# Patient Record
Sex: FEMALE | Race: BLACK OR AFRICAN AMERICAN | Employment: FULL TIME | URBAN - METROPOLITAN AREA
[De-identification: names, ages, dates, MRNs, and addresses within clinical notes are randomized per-mention and may not be internally consistent; named-entity substitution may affect disease eponyms.]

---

## 2019-07-07 ENCOUNTER — HOSPITAL ENCOUNTER (EMERGENCY)
Age: 39
Discharge: HOME | End: 2019-07-07
Attending: EMERGENCY MEDICINE
Payer: MEDICAID

## 2019-07-07 ENCOUNTER — APPOINTMENT (OUTPATIENT)
Dept: GENERAL RADIOLOGY | Age: 39
End: 2019-07-07
Payer: MEDICAID

## 2019-07-07 VITALS
OXYGEN SATURATION: 100 % | WEIGHT: 122.9 LBS | SYSTOLIC BLOOD PRESSURE: 134 MMHG | RESPIRATION RATE: 16 BRPM | HEIGHT: 67 IN | HEART RATE: 79 BPM | TEMPERATURE: 98.1 F | DIASTOLIC BLOOD PRESSURE: 86 MMHG | BODY MASS INDEX: 19.29 KG/M2

## 2019-07-07 DIAGNOSIS — S29.019A THORACIC MYOFASCIAL STRAIN, INITIAL ENCOUNTER: ICD-10-CM

## 2019-07-07 DIAGNOSIS — S53.402A SPRAIN OF LEFT ELBOW, INITIAL ENCOUNTER: ICD-10-CM

## 2019-07-07 DIAGNOSIS — S16.1XXA STRAIN OF NECK MUSCLE, INITIAL ENCOUNTER: ICD-10-CM
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
## 2019-07-07 DIAGNOSIS — S83.92XA SPRAIN OF LEFT KNEE, UNSPECIFIED LIGAMENT, INITIAL ENCOUNTER: ICD-10-CM

## 2019-07-07 PROCEDURE — 72040 X-RAY EXAM NECK SPINE 2-3 VW: CPT

## 2019-07-07 PROCEDURE — 72070 X-RAY EXAM THORAC SPINE 2VWS: CPT

## 2019-07-07 PROCEDURE — 99283 EMERGENCY DEPT VISIT LOW MDM: CPT

## 2019-07-07 RX ORDER — IBUPROFEN 600 MG/1
600 TABLET ORAL EVERY 6 HOURS PRN
Qty: 30 TABLET | Refills: 0 | Status: SHIPPED | OUTPATIENT
Start: 2019-07-07

## 2019-07-07 ASSESSMENT — PAIN DESCRIPTION - ONSET
ONSET_2: ON-GOING
ONSET: ON-GOING

## 2019-07-07 ASSESSMENT — PAIN DESCRIPTION - DESCRIPTORS
DESCRIPTORS_3: SHARP
DESCRIPTORS: SHARP
DESCRIPTORS_2: BURNING

## 2019-07-07 ASSESSMENT — PAIN DESCRIPTION - PROGRESSION
CLINICAL_PROGRESSION_2: NOT CHANGED
CLINICAL_PROGRESSION: GRADUALLY WORSENING
CLINICAL_PROGRESSION_3: NOT CHANGED

## 2019-07-07 ASSESSMENT — PAIN DESCRIPTION - PAIN TYPE
TYPE: ACUTE PAIN
TYPE_2: ACUTE PAIN
TYPE_3: ACUTE PAIN

## 2019-07-07 ASSESSMENT — PAIN DESCRIPTION - DURATION
DURATION_3: INTERMITTENT
DURATION_2: CONTINUOUS

## 2019-07-07 ASSESSMENT — PAIN DESCRIPTION - ORIENTATION
ORIENTATION_3: LEFT
ORIENTATION: LEFT
ORIENTATION_2: POSTERIOR

## 2019-07-07 ASSESSMENT — PAIN DESCRIPTION - LOCATION
LOCATION: NECK
LOCATION_2: KNEE
LOCATION_3: ELBOW

## 2019-07-07 ASSESSMENT — PAIN SCALES - GENERAL: PAINLEVEL_OUTOF10: 7

## 2019-07-07 ASSESSMENT — PAIN DESCRIPTION - INTENSITY
RATING_2: 2
RATING_3: 1

## 2019-07-07 ASSESSMENT — PAIN DESCRIPTION - FREQUENCY: FREQUENCY: CONTINUOUS
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
## 2019-07-07 NOTE — ED NOTES
The patient refused an ice pack at this time. Will continue to monitor.      Estrada Ross RN  07/07/19 1942

## 2023-10-15 ENCOUNTER — HOSPITAL ENCOUNTER (EMERGENCY)
Age: 43
Discharge: HOME | End: 2023-10-15
Attending: STUDENT IN AN ORGANIZED HEALTH CARE EDUCATION/TRAINING PROGRAM
Payer: MEDICAID

## 2023-10-15 VITALS
HEART RATE: 94 BPM | WEIGHT: 137.79 LBS | SYSTOLIC BLOOD PRESSURE: 140 MMHG | BODY MASS INDEX: 21.63 KG/M2 | HEIGHT: 67 IN | OXYGEN SATURATION: 99 % | DIASTOLIC BLOOD PRESSURE: 91 MMHG | TEMPERATURE: 98.5 F | RESPIRATION RATE: 16 BRPM

## 2023-10-15 DIAGNOSIS — R21 RASH: Primary | ICD-10-CM

## 2023-10-15 PROCEDURE — 99283 EMERGENCY DEPT VISIT LOW MDM: CPT

## 2023-10-15 PROCEDURE — 6370000000 HC RX 637 (ALT 250 FOR IP): Performed by: STUDENT IN AN ORGANIZED HEALTH CARE EDUCATION/TRAINING PROGRAM

## 2023-10-15 RX ORDER — PREDNISONE 20 MG/1
60 TABLET ORAL ONCE
Status: COMPLETED | OUTPATIENT
Start: 2023-10-15 | End: 2023-10-15

## 2023-10-15 RX ORDER — PREDNISONE 20 MG/1
TABLET ORAL
Qty: 42 TABLET | Refills: 0 | Status: SHIPPED | OUTPATIENT
Start: 2023-10-15 | End: 2023-11-04

## 2023-10-15 RX ORDER — BICTEGRAVIR SODIUM, EMTRICITABINE, AND TENOFOVIR ALAFENAMIDE FUMARATE 50; 200; 25 MG/1; MG/1; MG/1
TABLET ORAL
COMMUNITY
Start: 2023-09-15

## 2023-10-15 RX ORDER — DIPHENHYDRAMINE HCL 25 MG
25 TABLET ORAL
Status: COMPLETED | OUTPATIENT
Start: 2023-10-15 | End: 2023-10-15

## 2023-10-15 RX ORDER — CETIRIZINE HYDROCHLORIDE 10 MG/1
10 TABLET ORAL DAILY
Qty: 30 TABLET | Refills: 0 | Status: SHIPPED | OUTPATIENT
Start: 2023-10-15 | End: 2023-11-14

## 2023-10-15 RX ORDER — DIAPER,BRIEF,INFANT-TODD,DISP
EACH MISCELLANEOUS
Qty: 14 G | Refills: 0 | Status: SHIPPED | OUTPATIENT
Start: 2023-10-15 | End: 2023-10-22

## 2023-10-15 RX ADMIN — DIPHENHYDRAMINE HCL 25 MG: 25 TABLET ORAL at 19:55

## 2023-10-15 RX ADMIN — PREDNISONE 60 MG: 20 TABLET ORAL at 19:55

## 2023-10-15 ASSESSMENT — LIFESTYLE VARIABLES
HOW MANY STANDARD DRINKS CONTAINING ALCOHOL DO YOU HAVE ON A TYPICAL DAY: 1 OR 2
HOW OFTEN DO YOU HAVE A DRINK CONTAINING ALCOHOL: MONTHLY OR LESS

## 2023-10-15 ASSESSMENT — PAIN - FUNCTIONAL ASSESSMENT
PAIN_FUNCTIONAL_ASSESSMENT: PREVENTS OR INTERFERES SOME ACTIVE ACTIVITIES AND ADLS
PAIN_FUNCTIONAL_ASSESSMENT: 0-10

## 2023-10-15 ASSESSMENT — PAIN DESCRIPTION - PAIN TYPE: TYPE: ACUTE PAIN

## 2023-10-15 ASSESSMENT — PAIN SCALES - GENERAL: PAINLEVEL_OUTOF10: 8

## 2023-10-15 ASSESSMENT — PAIN DESCRIPTION - ONSET: ONSET: ON-GOING

## 2023-10-15 ASSESSMENT — PAIN DESCRIPTION - DESCRIPTORS: DESCRIPTORS: BURNING;ITCHING

## 2023-10-15 ASSESSMENT — PAIN DESCRIPTION - LOCATION: LOCATION: GENERALIZED

## 2023-10-15 ASSESSMENT — PAIN DESCRIPTION - FREQUENCY: FREQUENCY: CONTINUOUS

## 2023-10-15 NOTE — ED TRIAGE NOTES
Hx eczema and folliculitis, seen derm and feels like she is having a fare up. All over body, very itchy, dry, burning.

## 2023-10-16 NOTE — ED PROVIDER NOTES
99 % 5' 7\" (1.702 m) 137 lb 12.6 oz (62.5 kg)     General: A&O x 3, well appearing, NAD  Eyes: Extraocular movements grossly intact. No scleral icterus. Sclera non-injected. HEENT: Atraumatic. Normocephalic. Moist mucous membranes. CV: RRR, No murmurs or rubs. Resp: Clear to auscultation bilaterally. Normal respiratory effort. GI: Soft, nontender to palpation. Nondistended. MSK: No deformity, moving all extremities  Skin: Multiple flesh-colored papules with excoriation marks involving patient's chest, upper extremities, lower extremities, back, abdomen, neck. No active drainage or discharge coming from rash. Rash appears dry in nature. No skin sloughing or bullae visualized. No erythema, warmth  Neuro: Fluent speech. Symmetric facies. Answers questions appropriately. Normal gait. Psych: Appropriate affect, appropriate mood, cooperative. LABS  I have reviewed all labs for this visit. No results found for this visit on 10/15/23. RADIOLOGY  I have reviewed all radiographic studies for this visit. No orders to display          My ECG interpretation   N/A    ED COURSE/MDM    44 y.o. female presenting to the ED for rash. Patient is hemodynamically stable upon arrival to the emergency department. Patient is afebrile. Differential diagnose includes but not limited to contact dermatitis, folliculitis, eczema, cellulitis, scabies, bedbugs bites. Low suspicion for scabies given patient's rash does not exhibit a tract like fashion. Low suspicion for bedbugs considering patient has checked multiple times. Patient's rash could be secondary to eczema versus folliculitis. Low suspicion for HIV-associated rash given patient is still taking her Biktarvy and states her labs have been normal.  Low suspicion for syphilis considering rash does not involve palms and soles of feet only dorsal aspect of upper and lower extremities. Patient will be prescribed steroid taper and Zyrtec for symptom control.

## 2023-12-09 ENCOUNTER — HOSPITAL ENCOUNTER (EMERGENCY)
Age: 43
Discharge: HOME | End: 2023-12-09
Attending: STUDENT IN AN ORGANIZED HEALTH CARE EDUCATION/TRAINING PROGRAM
Payer: MEDICAID

## 2023-12-09 VITALS
RESPIRATION RATE: 14 BRPM | DIASTOLIC BLOOD PRESSURE: 100 MMHG | HEART RATE: 72 BPM | TEMPERATURE: 98.1 F | BODY MASS INDEX: 23.29 KG/M2 | SYSTOLIC BLOOD PRESSURE: 165 MMHG | WEIGHT: 148.37 LBS | OXYGEN SATURATION: 98 % | HEIGHT: 67 IN

## 2023-12-09 DIAGNOSIS — K04.7 DENTAL INFECTION: ICD-10-CM

## 2023-12-09 DIAGNOSIS — K08.89 PAIN, DENTAL: Primary | ICD-10-CM

## 2023-12-09 PROCEDURE — 99283 EMERGENCY DEPT VISIT LOW MDM: CPT

## 2023-12-09 RX ORDER — PENICILLIN V POTASSIUM 500 MG/1
500 TABLET ORAL 4 TIMES DAILY
Qty: 28 TABLET | Refills: 0 | Status: SHIPPED | OUTPATIENT
Start: 2023-12-09 | End: 2023-12-16

## 2023-12-09 ASSESSMENT — PAIN SCALES - GENERAL
PAINLEVEL_OUTOF10: 10
PAINLEVEL_OUTOF10: 10

## 2023-12-09 ASSESSMENT — PAIN - FUNCTIONAL ASSESSMENT
PAIN_FUNCTIONAL_ASSESSMENT: 0-10
PAIN_FUNCTIONAL_ASSESSMENT: 0-10

## 2023-12-09 ASSESSMENT — PAIN DESCRIPTION - ONSET: ONSET: ON-GOING

## 2023-12-09 ASSESSMENT — PAIN DESCRIPTION - PAIN TYPE: TYPE: ACUTE PAIN

## 2023-12-09 ASSESSMENT — PAIN DESCRIPTION - FREQUENCY: FREQUENCY: CONTINUOUS

## 2023-12-09 ASSESSMENT — PAIN DESCRIPTION - ORIENTATION: ORIENTATION: LOWER

## 2023-12-09 ASSESSMENT — PAIN DESCRIPTION - LOCATION: LOCATION: TEETH

## 2023-12-09 ASSESSMENT — PAIN DESCRIPTION - DESCRIPTORS: DESCRIPTORS: ACHING

## 2023-12-09 NOTE — ED NOTES
Dc'd to home  aware she needs to have bp rechecked by pmd  Walked out with ease  skin warm and dry  given paper RX per her request  to follow up with dentist  she told me she thought her bp being elevated was due to pain       Stoney Plascencia RN  12/09/23 8955

## 2023-12-09 NOTE — DISCHARGE INSTRUCTIONS
requires proof of income (example: pay stub or tax return). Scale is based on family size and income. Discounts can be 25%, 50%, 75%, or 100% of all services. Accepts [facility], Insurance, [address]  [address] [place] [address] [place], [place]  [phone]   Monday through Friday 8:00am-5:00pm  Must be a resident of [facility]   Sliding Fee Scale  *Scale requires proof of income (example: pay stub or tax return). Scale is based on family size and income. Discounts can be 25%, 50%, 75%, or 100% of all services. Accepts [facility], [employer], [address] [facility], [address] [place] [address]  [address] [place] [address] [place]  [phone]. [phone]   Monday-Friday 8am-4pm  Appointment Only   Will do emergency patients, but you must call first to get an appointment. No residency requirements  Sees only children up to the age of [place], Insurance, [address] [place] [address] Avenue of [address] [place] [address]  [phone]   Monday: 10:30 am - 6:30 pm Tuesday through Friday:  8 a.m. - 4 p.m. Appointment Only (will try to schedule a dental emergency in 1-3 days)   No residency requirements   Sliding Fee Scale  *Scale requires proof of income (example: pay stub or tax return). Scale is based on family size and income. Discounts can be 25%, 50%, 75%, or 100% of all services. Accepts [facility], Insurance, [address] [facility], 25 [facility]  [phone]  Monday 10:30am - 7:00pm Tuesday through Friday 8:15am - 5:00pm   Appointment Only No residency requirements   Sliding Fee Scale  *Scale requires proof of income (example: pay stub or tax return). Scale is based on family size and income. Discounts can be 25%, 50%, 75%, or 100% of all services.    [address] Medicaid, [address], [address] [place] [address] Primary Care  [place]  [address] [facility], [address] [place] [address]  [phone]  Monday 10:30am - 7:00pm Tuesday through Friday 8:15am - 5:00pm   Appointment Only No residency requirements   Sliding Fee Scale  *Scale requires proof of income (example: pay stub or tax return). Scale is based on family size and income. Discounts can be 25%, 50%, 75%, or 100% of all services. [facility], [facility] W [facility]   [address] [facility]  [phone]  Monday through Friday 8:15am - 5:00pm   Appointment Only No residency requirements   Sliding Fee Scale  *Scale requires proof of income (example: pay stub or tax return). Scale is based on family size and income. Discounts can be 25%, 50%, 75%, or 100% of all services.    [facility], Self-pay

## 2023-12-09 NOTE — ED PROVIDER NOTES
[address] [facility]    CHIEF COMPLAINT  Dental Pain (Lower teeth under gold crowns on teeth)       HISTORY OF PRESENT ILLNESS  Izabel Villalpando is a 44 y.o. female presenting to the ED for dental pain. Patient states dental pain onset started \"months ago. \"  Dental pain acutely worsened causing her to come to the emergency department today. Patient states she had a dental procedure 3 years ago by oral surgeons at [facility]. Patient states she has occasional fevers that have developed. Patient states she has taken Tylenol without significant relief of pain. Patient does state she has pain involving bottom incisors which are covered by gold caps. Cold caps were installed almost 20 years ago when patient was living in [place]. Patient states 3 years ago she had the majority of her bottom teeth pulled. - History obtained from: Patient  - Limitations to history: None    I have reviewed the following from the nursing documentation:    Past Medical History:   Diagnosis Date    Asthma     Folliculitis     HIV (human immunodeficiency virus infection) ([address] )     HIV (human immunodeficiency virus infection) ([address] )      Past Surgical History:   Procedure Laterality Date    LYMPH NODE BIOPSY       History reviewed. No pertinent family history. Social History     Socioeconomic History    Marital status: Single     Spouse name: Not on file    Number of children: Not on file    Years of education: Not on file    Highest education level: Not on file   Occupational History    Not on file   Tobacco Use    Smoking status: Every Day     Packs/day: .5     Types: Cigarettes    Smokeless tobacco: Former   Substance and Sexual Activity    Alcohol use: Yes     Comment: occ. Drug use: Yes     Types: Marijuana [place]     Comment: Daily.     Sexual activity: Not on file   Other Topics Concern    Not on file   Social History Narrative    Not on file     Social Determinants of Health     Financial Resource facies. Answers questions appropriately. Normal gait. Psych: Appropriate affect, appropriate mood, cooperative. LABS  I have reviewed all labs for this visit. No results found for this visit on 12/09/23. RADIOLOGY  I have reviewed all radiographic studies for this visit. No orders to display          My ECG interpretation   N/A    ED COURSE/MDM    44 y.o. female presenting to the ED for dental pain. Patient is hemodynamically stable upon arrival to the emergency department. Patient hypertensive at 165/100. Patient has no neurological symptoms. Patient has no visual changes. Patient states normal she is not hypertensive, hypertension could be secondary to pain. Afebrile 36.7. Pulse 72. Respiratory rate 14. Satting 98% on room air. Differential diagnosis includes but not limited to periodontal abscess, dental fracture, dental caries, periapical abscess. Low suspicion for Ivan's or peritonsillar abscess based on physical examination. Patient offered dental block in the emergency department for pain control however she declined. On physical exam no obvious periapical abscess however patient does have significant gum disease and pain with palpation involving the bottom incisors. Patient could have underlying dental infection. Patient was agreeable to antibiotics. Patient was offered Tylenol and ibuprofen prescriptions however she declined. Patient requested that I prescribe her tramadol for her dental pain. I informed patient that pain medication prescriptions this provider dependent and I would be more comfortable prescribing Tylenol or ibuprofen to see how she trials on those medication. Patient stated that she would obtain tramadol prescription from her primary care doctor. Patient was given resources for dental clinics and discharge paperwork. Patient given return precautions. - Chronic Conditions: HIV, asthma  - Records reviewed:  Infectious disease note from 10/17/2023    -

## 2024-05-12 ENCOUNTER — OFFICE VISIT (OUTPATIENT)
Age: 44
End: 2024-05-12

## 2024-05-12 VITALS
BODY MASS INDEX: 22.55 KG/M2 | DIASTOLIC BLOOD PRESSURE: 88 MMHG | OXYGEN SATURATION: 99 % | WEIGHT: 144 LBS | RESPIRATION RATE: 18 BRPM | HEART RATE: 71 BPM | SYSTOLIC BLOOD PRESSURE: 166 MMHG | TEMPERATURE: 97.6 F

## 2024-05-12 DIAGNOSIS — R03.0 ELEVATED BLOOD-PRESSURE READING WITHOUT DIAGNOSIS OF HYPERTENSION: ICD-10-CM

## 2024-05-12 DIAGNOSIS — Z87.09 HISTORY OF ASTHMA: Primary | ICD-10-CM

## 2024-05-12 RX ORDER — ALBUTEROL SULFATE 90 UG/1
AEROSOL, METERED RESPIRATORY (INHALATION)
Qty: 18 G | Refills: 0 | Status: SHIPPED | OUTPATIENT
Start: 2024-05-12

## 2024-05-12 RX ORDER — ALBUTEROL SULFATE 90 UG/1
AEROSOL, METERED RESPIRATORY (INHALATION)
COMMUNITY
End: 2024-05-12 | Stop reason: SDUPTHER

## 2024-11-21 ENCOUNTER — HOSPITAL ENCOUNTER (EMERGENCY)
Age: 44
Discharge: HOME | End: 2024-11-21
Payer: MEDICAID

## 2024-11-21 ENCOUNTER — APPOINTMENT (OUTPATIENT)
Dept: CT IMAGING | Age: 44
End: 2024-11-21
Payer: MEDICAID

## 2024-11-21 ENCOUNTER — APPOINTMENT (OUTPATIENT)
Dept: GENERAL RADIOLOGY | Age: 44
End: 2024-11-21
Payer: MEDICAID

## 2024-11-21 VITALS
RESPIRATION RATE: 18 BRPM | HEIGHT: 67 IN | SYSTOLIC BLOOD PRESSURE: 132 MMHG | WEIGHT: 132.72 LBS | TEMPERATURE: 97.8 F | DIASTOLIC BLOOD PRESSURE: 96 MMHG | HEART RATE: 99 BPM | OXYGEN SATURATION: 98 % | BODY MASS INDEX: 20.83 KG/M2

## 2024-11-21 DIAGNOSIS — J06.9 VIRAL URI WITH COUGH: Primary | ICD-10-CM

## 2024-11-21 DIAGNOSIS — R79.89 ELEVATED BRAIN NATRIURETIC PEPTIDE (BNP) LEVEL: ICD-10-CM

## 2024-11-21 DIAGNOSIS — J81.0 PULMONARY EDEMA, ACUTE: ICD-10-CM

## 2024-11-21 DIAGNOSIS — Z72.0 TOBACCO USE: ICD-10-CM

## 2024-11-21 DIAGNOSIS — J45.901 EXACERBATION OF ASTHMA, UNSPECIFIED ASTHMA SEVERITY, UNSPECIFIED WHETHER PERSISTENT: ICD-10-CM

## 2024-11-21 LAB
ALBUMIN SERPL-MCNC: 3.2 G/DL (ref 3.4–5)
ALBUMIN/GLOB SERPL: 1 {RATIO} (ref 1.1–2.2)
ALP SERPL-CCNC: 62 U/L (ref 40–129)
ALT SERPL-CCNC: 23 U/L (ref 10–40)
ANION GAP SERPL CALCULATED.3IONS-SCNC: 14 MMOL/L (ref 3–16)
ANISOCYTOSIS BLD QL SMEAR: ABNORMAL
AST SERPL-CCNC: 79 U/L (ref 15–37)
BASOPHILS # BLD: 0 K/UL (ref 0–0.2)
BASOPHILS NFR BLD: 0.5 %
BILIRUB SERPL-MCNC: 0.4 MG/DL (ref 0–1)
BUN SERPL-MCNC: 15 MG/DL (ref 7–20)
CALCIUM SERPL-MCNC: 7.4 MG/DL (ref 8.3–10.6)
CHLORIDE SERPL-SCNC: 106 MMOL/L (ref 99–110)
CO2 SERPL-SCNC: 22 MMOL/L (ref 21–32)
CREAT SERPL-MCNC: 1 MG/DL (ref 0.6–1.1)
D-DIMER QUANTITATIVE: 7.03 UG/ML FEU (ref 0–0.6)
DEPRECATED RDW RBC AUTO: 19.5 % (ref 12.4–15.4)
EOSINOPHIL # BLD: 0 K/UL (ref 0–0.6)
EOSINOPHIL NFR BLD: 0 %
FLUAV + FLUBV AG NOSE IA.RAPID: NOT DETECTED
FLUAV + FLUBV AG NOSE IA.RAPID: NOT DETECTED
GFR SERPLBLD CREATININE-BSD FMLA CKD-EPI: 71 ML/MIN/{1.73_M2}
GLUCOSE SERPL-MCNC: 101 MG/DL (ref 70–99)
HCT VFR BLD AUTO: 34.4 % (ref 36–48)
HGB BLD-MCNC: 11.3 G/DL (ref 12–16)
LYMPHOCYTES # BLD: 1.1 K/UL (ref 1–5.1)
LYMPHOCYTES NFR BLD: 30.1 %
MCH RBC QN AUTO: 27 PG (ref 26–34)
MCHC RBC AUTO-ENTMCNC: 32.8 G/DL (ref 31–36)
MCV RBC AUTO: 82.2 FL (ref 80–100)
MICROCYTES BLD QL SMEAR: ABNORMAL
MONOCYTES # BLD: 0.2 K/UL (ref 0–1.3)
MONOCYTES NFR BLD: 6.2 %
NEUTROPHILS # BLD: 2.3 K/UL (ref 1.7–7.7)
NEUTROPHILS NFR BLD: 63.2 %
NT-PROBNP SERPL-MCNC: ABNORMAL PG/ML (ref 0–124)
OVALOCYTES BLD QL SMEAR: ABNORMAL
PATH INTERP BLD-IMP: YES
PLATELET # BLD AUTO: 137 K/UL (ref 135–450)
PLATELET BLD QL SMEAR: ADEQUATE
PMV BLD AUTO: 8.9 FL (ref 5–10.5)
POIKILOCYTOSIS BLD QL SMEAR: ABNORMAL
POTASSIUM SERPL-SCNC: 3.6 MMOL/L (ref 3.5–5.1)
PROT SERPL-MCNC: 6.5 G/DL (ref 6.4–8.2)
RBC # BLD AUTO: 4.19 M/UL (ref 4–5.2)
REASON FOR REJECTION: NORMAL
REJECTED TEST: NORMAL
SARS-COV-2 RDRP RESP QL NAA+PROBE: NOT DETECTED
SCHISTOCYTES BLD QL SMEAR: ABNORMAL
SLIDE REVIEW: ABNORMAL
SODIUM SERPL-SCNC: 142 MMOL/L (ref 136–145)
TROPONIN, HIGH SENSITIVITY: 30 NG/L (ref 0–14)
TROPONIN, HIGH SENSITIVITY: 32 NG/L (ref 0–14)
WBC # BLD AUTO: 3.7 K/UL (ref 4–11)

## 2024-11-21 PROCEDURE — 84484 ASSAY OF TROPONIN QUANT: CPT

## 2024-11-21 PROCEDURE — 96374 THER/PROPH/DIAG INJ IV PUSH: CPT

## 2024-11-21 PROCEDURE — 87502 INFLUENZA DNA AMP PROBE: CPT

## 2024-11-21 PROCEDURE — 83880 ASSAY OF NATRIURETIC PEPTIDE: CPT

## 2024-11-21 PROCEDURE — 80053 COMPREHEN METABOLIC PANEL: CPT

## 2024-11-21 PROCEDURE — 93005 ELECTROCARDIOGRAM TRACING: CPT | Performed by: GENERAL ACUTE CARE HOSPITAL

## 2024-11-21 PROCEDURE — 6370000000 HC RX 637 (ALT 250 FOR IP): Performed by: GENERAL ACUTE CARE HOSPITAL

## 2024-11-21 PROCEDURE — 71260 CT THORAX DX C+: CPT

## 2024-11-21 PROCEDURE — 71046 X-RAY EXAM CHEST 2 VIEWS: CPT

## 2024-11-21 PROCEDURE — 96375 TX/PRO/DX INJ NEW DRUG ADDON: CPT

## 2024-11-21 PROCEDURE — 6360000004 HC RX CONTRAST MEDICATION: Performed by: GENERAL ACUTE CARE HOSPITAL

## 2024-11-21 PROCEDURE — 94640 AIRWAY INHALATION TREATMENT: CPT

## 2024-11-21 PROCEDURE — 99285 EMERGENCY DEPT VISIT HI MDM: CPT

## 2024-11-21 PROCEDURE — 94760 N-INVAS EAR/PLS OXIMETRY 1: CPT

## 2024-11-21 PROCEDURE — 85379 FIBRIN DEGRADATION QUANT: CPT

## 2024-11-21 PROCEDURE — 87635 SARS-COV-2 COVID-19 AMP PRB: CPT

## 2024-11-21 PROCEDURE — 85025 COMPLETE CBC W/AUTO DIFF WBC: CPT

## 2024-11-21 PROCEDURE — 6360000002 HC RX W HCPCS: Performed by: GENERAL ACUTE CARE HOSPITAL

## 2024-11-21 RX ORDER — FUROSEMIDE 40 MG/1
40 TABLET ORAL DAILY
Qty: 7 TABLET | Refills: 0 | Status: SHIPPED | OUTPATIENT
Start: 2024-11-21 | End: 2024-11-28

## 2024-11-21 RX ORDER — KETOROLAC TROMETHAMINE 30 MG/ML
30 INJECTION, SOLUTION INTRAMUSCULAR; INTRAVENOUS ONCE
Status: DISCONTINUED | OUTPATIENT
Start: 2024-11-21 | End: 2024-11-21

## 2024-11-21 RX ORDER — PREDNISONE 20 MG/1
60 TABLET ORAL ONCE
Status: COMPLETED | OUTPATIENT
Start: 2024-11-21 | End: 2024-11-21

## 2024-11-21 RX ORDER — IOPAMIDOL 755 MG/ML
75 INJECTION, SOLUTION INTRAVASCULAR
Status: COMPLETED | OUTPATIENT
Start: 2024-11-21 | End: 2024-11-21

## 2024-11-21 RX ORDER — HYDROCODONE BITARTRATE AND HOMATROPINE METHYLBROMIDE ORAL SOLUTION 5; 1.5 MG/5ML; MG/5ML
5 LIQUID ORAL ONCE
Status: COMPLETED | OUTPATIENT
Start: 2024-11-21 | End: 2024-11-21

## 2024-11-21 RX ORDER — IPRATROPIUM BROMIDE AND ALBUTEROL SULFATE 2.5; .5 MG/3ML; MG/3ML
1 SOLUTION RESPIRATORY (INHALATION) ONCE
Status: COMPLETED | OUTPATIENT
Start: 2024-11-21 | End: 2024-11-21

## 2024-11-21 RX ORDER — DORAVIRINE 100 MG/1
100 TABLET, FILM COATED ORAL DAILY
COMMUNITY

## 2024-11-21 RX ORDER — KETOROLAC TROMETHAMINE 30 MG/ML
30 INJECTION, SOLUTION INTRAMUSCULAR; INTRAVENOUS ONCE
Status: COMPLETED | OUTPATIENT
Start: 2024-11-21 | End: 2024-11-21

## 2024-11-21 RX ORDER — KETOROLAC TROMETHAMINE 30 MG/ML
60 INJECTION, SOLUTION INTRAMUSCULAR; INTRAVENOUS ONCE
Status: DISCONTINUED | OUTPATIENT
Start: 2024-11-21 | End: 2024-11-21

## 2024-11-21 RX ORDER — FUROSEMIDE 10 MG/ML
40 INJECTION INTRAMUSCULAR; INTRAVENOUS ONCE
Status: COMPLETED | OUTPATIENT
Start: 2024-11-21 | End: 2024-11-21

## 2024-11-21 RX ADMIN — PREDNISONE 60 MG: 20 TABLET ORAL at 18:51

## 2024-11-21 RX ADMIN — IPRATROPIUM BROMIDE AND ALBUTEROL SULFATE 1 DOSE: .5; 3 SOLUTION RESPIRATORY (INHALATION) at 18:33

## 2024-11-21 RX ADMIN — Medication 5 ML: at 18:52

## 2024-11-21 RX ADMIN — IOPAMIDOL 75 ML: 755 INJECTION, SOLUTION INTRAVENOUS at 19:54

## 2024-11-21 RX ADMIN — FUROSEMIDE 40 MG: 10 INJECTION, SOLUTION INTRAMUSCULAR; INTRAVENOUS at 21:47

## 2024-11-21 RX ADMIN — KETOROLAC TROMETHAMINE 30 MG: 30 INJECTION, SOLUTION INTRAMUSCULAR at 19:34

## 2024-11-21 ASSESSMENT — PAIN DESCRIPTION - ORIENTATION: ORIENTATION: MID;LEFT

## 2024-11-21 ASSESSMENT — LIFESTYLE VARIABLES
HOW OFTEN DO YOU HAVE A DRINK CONTAINING ALCOHOL: NEVER
HOW MANY STANDARD DRINKS CONTAINING ALCOHOL DO YOU HAVE ON A TYPICAL DAY: PATIENT DOES NOT DRINK

## 2024-11-21 ASSESSMENT — PAIN DESCRIPTION - FREQUENCY: FREQUENCY: INTERMITTENT

## 2024-11-21 ASSESSMENT — PAIN SCALES - GENERAL
PAINLEVEL_OUTOF10: 6
PAINLEVEL_OUTOF10: 2
PAINLEVEL_OUTOF10: 8

## 2024-11-21 ASSESSMENT — PAIN DESCRIPTION - LOCATION
LOCATION: CHEST;BACK;RIB CAGE
LOCATION: CHEST;RIB CAGE;BACK

## 2024-11-21 ASSESSMENT — PAIN - FUNCTIONAL ASSESSMENT
PAIN_FUNCTIONAL_ASSESSMENT: 0-10
PAIN_FUNCTIONAL_ASSESSMENT: 0-10
PAIN_FUNCTIONAL_ASSESSMENT: ACTIVITIES ARE NOT PREVENTED

## 2024-11-21 ASSESSMENT — PAIN DESCRIPTION - DESCRIPTORS: DESCRIPTORS: SHARP

## 2024-11-21 ASSESSMENT — PAIN DESCRIPTION - PAIN TYPE: TYPE: ACUTE PAIN

## 2024-11-21 NOTE — ED TRIAGE NOTES
Pt states that she has been experiencing trouble breathing for the past week. Pt states that she has hx of asthma. Pt states that she has been using her inhaler with no relief. Pt endorses pain in back, chest, and side. Pt endorses productive cough with yellow sputum. Pt endorses chills with no known fever. Pt AAO x 4. VSS.

## 2024-11-21 NOTE — ED PROVIDER NOTES
lower leg: No tenderness. No edema.   Skin:     General: Skin is warm and dry.      Capillary Refill: Capillary refill takes less than 2 seconds.   Neurological:      General: No focal deficit present.      Mental Status: She is alert and oriented to person, place, and time.   Psychiatric:         Mood and Affect: Mood normal.         Behavior: Behavior normal.         Thought Content: Thought content normal.         Judgment: Judgment normal.         MEDICAL DECISION MAKING    Vitals:    Vitals:    11/21/24 1733 11/21/24 1833 11/21/24 2032 11/21/24 2148   BP: (!) 133/93  120/87 (!) 132/96   Pulse: (!) 113  97 99   Resp: 18      Temp: 97.8 °F (36.6 °C)      TempSrc: Oral      SpO2: 100% 99% 100% 98%   Weight: 60.2 kg (132 lb 11.5 oz)      Height: 1.702 m (5' 7\")          LABS:  Labs Reviewed   CBC WITH AUTO DIFFERENTIAL - Abnormal; Notable for the following components:       Result Value    WBC 3.7 (*)     Hemoglobin 11.3 (*)     Hematocrit 34.4 (*)     RDW 19.5 (*)     Anisocytosis 1+ (*)     Microcytes 1+ (*)     Poikilocytes 2+ (*)     Schistocytes 1+ (*)     Ovalocytes 1+ (*)     All other components within normal limits   TROPONIN - Abnormal; Notable for the following components:    Troponin, High Sensitivity 30 (*)     All other components within normal limits   D-DIMER, QUANTITATIVE - Abnormal; Notable for the following components:    D-Dimer, Quant 7.03 (*)     All other components within normal limits   COMPREHENSIVE METABOLIC PANEL W/ REFLEX TO MG FOR LOW K - Abnormal; Notable for the following components:    Glucose 101 (*)     Calcium 7.4 (*)     Albumin 3.2 (*)     Albumin/Globulin Ratio 1.0 (*)     AST 79 (*)     All other components within normal limits    Narrative:     Prior specimen rejected: Hemolyzed. Advised ED of need for recollect.   TROPONIN - Abnormal; Notable for the following components:    Troponin, High Sensitivity 32 (*)     All other components within normal limits    Narrative:      mildly hypertensive with blood pressure 133/93.  Heart rate is 113.  She is not hypoxic.  No increased work of breathing noted.  Patient with intermittent nonproductive cough noted throughout assessment.    Differential diagnoses include but not limited to viral URI, COVID, influenza, pneumonia, ACS, MI, cardiac arrhythmia, PE    An EKG is interpreted by ED physician and reviewed by myself and is negative for acute ST elevation or cardiac arrhythmia.    Patient given DuoNeb breathing treatment per RT.  She is given IV Toradol 30 mg and prednisone 60 mg by mouth.  Laboratory and imaging studies are pending.    Disposition Considerations (Tests not ordered but considered, Shared Decision Making, Pt Expectation of Test or Tx.): ***           The patient tolerated their visit well.  I evaluated the patient.  The physician was available for consultation as needed.  The patient and / or the family were informed of the results of any tests, a time was given to answer questions, a plan was proposed and they agreed with plan.     I am the Primary Clinician of Record.     CLINICAL IMPRESSION:  1. Viral URI with cough    2. Exacerbation of asthma, unspecified asthma severity, unspecified whether persistent    3. Pulmonary edema, acute    4. Elevated brain natriuretic peptide (BNP) level    5. Tobacco use        DISPOSITION Decision To Discharge 11/21/2024 09:41:28 PM           PATIENT REFERRED TO:  Jabier Bonds MD  [address] [place] [address]  Infectious Disease  [facility] [address] [phone]    Call in 1 day      [address]  [address]  [facility] [address] [phone]  In 1 day  Cardiology-call first thing tomorrow morning to arrange for follow-up appointment      DISCHARGE MEDICATIONS:  New Prescriptions    FUROSEMIDE (LASIX) 40 MG TABLET    Take 1 tablet by mouth daily for 7 days       DISCONTINUED MEDICATIONS:  Discontinued Medications    No

## 2024-11-22 LAB
EKG ATRIAL RATE: 111 BPM
EKG DIAGNOSIS: NORMAL
EKG P AXIS: 69 DEGREES
EKG P-R INTERVAL: 126 MS
EKG Q-T INTERVAL: 294 MS
EKG QRS DURATION: 74 MS
EKG QTC CALCULATION (BAZETT): 399 MS
EKG R AXIS: 87 DEGREES
EKG T AXIS: -70 DEGREES
EKG VENTRICULAR RATE: 111 BPM
PATH INTERP BLD-IMP: NORMAL

## 2024-11-22 PROCEDURE — 93010 ELECTROCARDIOGRAM REPORT: CPT | Performed by: INTERNAL MEDICINE

## 2024-11-22 NOTE — DISCHARGE INSTRUCTIONS
Take the prescribed prednisone and Lasix as directed.    Call your primary care provider tomorrow to arrange for close outpatient follow-up appointment.    Call tomorrow to arrange for close outpatient follow-up appointment with cardiology    Return for high fever, incessant vomiting, severe pain, any other worsening symptoms.

## 2024-11-25 ASSESSMENT — HEART SCORE: ECG: NORMAL

## 2024-11-25 ASSESSMENT — ENCOUNTER SYMPTOMS
NAUSEA: 0
BACK PAIN: 0
TROUBLE SWALLOWING: 0
WHEEZING: 1
CHEST TIGHTNESS: 1
VOMITING: 0
ABDOMINAL PAIN: 0
VOICE CHANGE: 0
COUGH: 1
SORE THROAT: 0
SHORTNESS OF BREATH: 1

## 2024-12-03 ENCOUNTER — APPOINTMENT (OUTPATIENT)
Dept: CT IMAGING | Age: 44
DRG: 192 | End: 2024-12-03
Payer: MEDICAID

## 2024-12-03 ENCOUNTER — APPOINTMENT (OUTPATIENT)
Age: 44
DRG: 192 | End: 2024-12-03
Attending: INTERNAL MEDICINE
Payer: MEDICAID

## 2024-12-03 ENCOUNTER — HOSPITAL ENCOUNTER (INPATIENT)
Age: 44
LOS: 6 days | Discharge: HOME | DRG: 192 | End: 2024-12-09
Attending: FAMILY MEDICINE | Admitting: FAMILY MEDICINE
Payer: MEDICAID

## 2024-12-03 DIAGNOSIS — I50.9 ACUTE CONGESTIVE HEART FAILURE, UNSPECIFIED HEART FAILURE TYPE (HCC): Primary | ICD-10-CM

## 2024-12-03 DIAGNOSIS — D64.9 ANEMIA, UNSPECIFIED TYPE: ICD-10-CM

## 2024-12-03 DIAGNOSIS — N28.9 RENAL IMPAIRMENT: ICD-10-CM

## 2024-12-03 DIAGNOSIS — I31.39 PERICARDIAL EFFUSION: ICD-10-CM

## 2024-12-03 DIAGNOSIS — R06.02 SHORTNESS OF BREATH: ICD-10-CM

## 2024-12-03 DIAGNOSIS — R79.89 ELEVATED TROPONIN: ICD-10-CM

## 2024-12-03 DIAGNOSIS — J90 PLEURAL EFFUSION: ICD-10-CM

## 2024-12-03 DIAGNOSIS — I50.9 CONGESTIVE HEART FAILURE, UNSPECIFIED HF CHRONICITY, UNSPECIFIED HEART FAILURE TYPE (HCC): ICD-10-CM

## 2024-12-03 DIAGNOSIS — I50.21 ACUTE SYSTOLIC (CONGESTIVE) HEART FAILURE (HCC): ICD-10-CM

## 2024-12-03 PROBLEM — I50.31 ACUTE DIASTOLIC CHF (CONGESTIVE HEART FAILURE) (HCC): Status: ACTIVE | Noted: 2024-12-03

## 2024-12-03 LAB
ALBUMIN SERPL-MCNC: 3.4 G/DL (ref 3.4–5)
ALBUMIN/GLOB SERPL: 1 {RATIO} (ref 1.1–2.2)
ALP SERPL-CCNC: 75 U/L (ref 40–129)
ALT SERPL-CCNC: 22 U/L (ref 10–40)
ANION GAP SERPL CALCULATED.3IONS-SCNC: 9 MMOL/L (ref 3–16)
ANISOCYTOSIS BLD QL SMEAR: ABNORMAL
AST SERPL-CCNC: 68 U/L (ref 15–37)
BACTERIA URNS QL MICRO: ABNORMAL /HPF
BASE EXCESS MIXED: 4
BASOPHILS # BLD: 0 K/UL (ref 0–0.2)
BASOPHILS NFR BLD: 0.9 %
BILIRUB SERPL-MCNC: 0.3 MG/DL (ref 0–1)
BILIRUB UR QL STRIP.AUTO: NEGATIVE
BUN SERPL-MCNC: 12 MG/DL (ref 7–20)
CALCIUM SERPL-MCNC: 7.8 MG/DL (ref 8.3–10.6)
CHLORIDE SERPL-SCNC: 106 MMOL/L (ref 99–110)
CLARITY UR: CLEAR
CO2 SERPL-SCNC: 25 MMOL/L (ref 21–32)
COLOR UR: YELLOW
CREAT SERPL-MCNC: 1.2 MG/DL (ref 0.6–1.1)
DEPRECATED RDW RBC AUTO: 20.5 % (ref 12.4–15.4)
ECHO AV AREA PEAK VELOCITY: 2.3 CM2
ECHO AV AREA VTI: 2.5 CM2
ECHO AV AREA/BSA PEAK VELOCITY: 1.4 CM2/M2
ECHO AV AREA/BSA VTI: 1.5 CM2/M2
ECHO AV MEAN GRADIENT: 2 MMHG
ECHO AV MEAN VELOCITY: 0.6 M/S
ECHO AV PEAK GRADIENT: 3 MMHG
ECHO AV PEAK VELOCITY: 0.8 M/S
ECHO AV VELOCITY RATIO: 0.75
ECHO AV VTI: 11.9 CM
ECHO BSA: 1.68 M2
ECHO IVC EXP: 2.2 CM
ECHO LV EF PHYSICIAN: 15 %
ECHO LV FRACTIONAL SHORTENING: 12 % (ref 28–44)
ECHO LV INTERNAL DIMENSION DIASTOLE INDEX: 2.96 CM/M2
ECHO LV INTERNAL DIMENSION DIASTOLIC: 5 CM (ref 3.9–5.3)
ECHO LV INTERNAL DIMENSION SYSTOLIC INDEX: 2.6 CM/M2
ECHO LV INTERNAL DIMENSION SYSTOLIC: 4.4 CM
ECHO LV IVSD: 1 CM (ref 0.6–0.9)
ECHO LV MASS 2D: 194.4 G (ref 67–162)
ECHO LV MASS INDEX 2D: 115 G/M2 (ref 43–95)
ECHO LV POSTERIOR WALL DIASTOLIC: 1.1 CM (ref 0.6–0.9)
ECHO LV RELATIVE WALL THICKNESS RATIO: 0.44
ECHO LVOT AREA: 3.1 CM2
ECHO LVOT AV VTI INDEX: 0.78
ECHO LVOT DIAM: 2 CM
ECHO LVOT MEAN GRADIENT: 1 MMHG
ECHO LVOT PEAK GRADIENT: 1 MMHG
ECHO LVOT PEAK VELOCITY: 0.6 M/S
ECHO LVOT STROKE VOLUME INDEX: 17.3 ML/M2
ECHO LVOT SV: 29.2 ML
ECHO LVOT VTI: 9.3 CM
ECHO RV BASAL DIMENSION: 4.2 CM
ECHO RV LONGITUDINAL DIMENSION: 8.5 CM
ECHO RV MID DIMENSION: 2.6 CM
ECHO RV TAPSE: 1.2 CM (ref 1.7–?)
EOSINOPHIL # BLD: 0 K/UL (ref 0–0.6)
EOSINOPHIL NFR BLD: 0.4 %
EPI CELLS #/AREA URNS AUTO: 14 /HPF (ref 0–5)
FERRITIN SERPL IA-MCNC: 365 NG/ML (ref 15–150)
FLUAV + FLUBV AG NOSE IA.RAPID: NOT DETECTED
FLUAV + FLUBV AG NOSE IA.RAPID: NOT DETECTED
GFR SERPLBLD CREATININE-BSD FMLA CKD-EPI: 57 ML/MIN/{1.73_M2}
GLUCOSE SERPL-MCNC: 96 MG/DL (ref 70–99)
GLUCOSE UR STRIP.AUTO-MCNC: NEGATIVE MG/DL
HCG SERPL QL: NEGATIVE
HCO3, MIXED: 29.3 MMOL/L
HCT VFR BLD AUTO: 31.7 % (ref 36–48)
HGB BLD-MCNC: 10.3 G/DL (ref 12–16)
HGB UR QL STRIP.AUTO: NEGATIVE
HYALINE CASTS #/AREA URNS AUTO: 1 /LPF (ref 0–8)
IRON SATN MFR SERPL: 14 % (ref 15–50)
IRON SERPL-MCNC: 30 UG/DL (ref 37–145)
KETONES UR STRIP.AUTO-MCNC: ABNORMAL MG/DL
LEUKOCYTE ESTERASE UR QL STRIP.AUTO: ABNORMAL
LYMPHOCYTES # BLD: 0.9 K/UL (ref 1–5.1)
LYMPHOCYTES NFR BLD: 29.1 %
MCH RBC QN AUTO: 27.7 PG (ref 26–34)
MCHC RBC AUTO-ENTMCNC: 32.3 G/DL (ref 31–36)
MCV RBC AUTO: 85.5 FL (ref 80–100)
MICROCYTES BLD QL SMEAR: ABNORMAL
MONOCYTES # BLD: 0.3 K/UL (ref 0–1.3)
MONOCYTES NFR BLD: 8.5 %
MUCUS: PRESENT
NEUTROPHILS # BLD: 1.9 K/UL (ref 1.7–7.7)
NEUTROPHILS NFR BLD: 61.1 %
NITRITE UR QL STRIP.AUTO: NEGATIVE
NT-PROBNP SERPL-MCNC: 7129 PG/ML (ref 0–124)
O2 SAT, MIXED: 50 %
OVALOCYTES BLD QL SMEAR: ABNORMAL
PATH INTERP BLD-IMP: NO
PCO2 MIXED: 48.5 MM HG
PERFORMED ON: NORMAL
PH UR STRIP.AUTO: 7.5 [PH] (ref 5–8)
PH, MIXED: 7.39 (ref 7.35–7.45)
PLATELET # BLD AUTO: 187 K/UL (ref 135–450)
PLATELET BLD QL SMEAR: ADEQUATE
PMV BLD AUTO: 7.9 FL (ref 5–10.5)
PO2 MIXED: 28 MM HG
POC SAMPLE TYPE: NORMAL
POIKILOCYTOSIS BLD QL SMEAR: ABNORMAL
POTASSIUM SERPL-SCNC: 4.1 MMOL/L (ref 3.5–5.1)
PROT SERPL-MCNC: 6.7 G/DL (ref 6.4–8.2)
PROT UR STRIP.AUTO-MCNC: 100 MG/DL
RBC # BLD AUTO: 3.71 M/UL (ref 4–5.2)
RBC CLUMPS #/AREA URNS AUTO: 9 /HPF (ref 0–4)
SARS-COV-2 RDRP RESP QL NAA+PROBE: NOT DETECTED
SCHISTOCYTES BLD QL SMEAR: ABNORMAL
SLIDE REVIEW: ABNORMAL
SODIUM SERPL-SCNC: 140 MMOL/L (ref 136–145)
SP GR UR STRIP.AUTO: 1.02 (ref 1–1.03)
T4 FREE SERPL-MCNC: 0.4 NG/DL (ref 0.9–1.8)
TCO2 CALC MIXED: 31 MMOL/L
TIBC SERPL-MCNC: 213 UG/DL (ref 260–445)
TROPONIN, HIGH SENSITIVITY: 28 NG/L (ref 0–14)
TROPONIN, HIGH SENSITIVITY: 30 NG/L (ref 0–14)
TSH SERPL DL<=0.005 MIU/L-ACNC: 4.73 UIU/ML (ref 0.27–4.2)
UA COMPLETE W REFLEX CULTURE PNL UR: YES
UA DIPSTICK W REFLEX MICRO PNL UR: YES
URN SPEC COLLECT METH UR: ABNORMAL
UROBILINOGEN UR STRIP-ACNC: 1 E.U./DL
WBC # BLD AUTO: 3.2 K/UL (ref 4–11)
WBC #/AREA URNS AUTO: 22 /HPF (ref 0–5)

## 2024-12-03 PROCEDURE — 2100000000 HC CCU R&B

## 2024-12-03 PROCEDURE — 71260 CT THORAX DX C+: CPT

## 2024-12-03 PROCEDURE — 83880 ASSAY OF NATRIURETIC PEPTIDE: CPT

## 2024-12-03 PROCEDURE — 6370000000 HC RX 637 (ALT 250 FOR IP): Performed by: FAMILY MEDICINE

## 2024-12-03 PROCEDURE — 94640 AIRWAY INHALATION TREATMENT: CPT

## 2024-12-03 PROCEDURE — 93321 DOPPLER ECHO F-UP/LMTD STD: CPT | Performed by: INTERNAL MEDICINE

## 2024-12-03 PROCEDURE — 99152 MOD SED SAME PHYS/QHP 5/>YRS: CPT | Performed by: INTERNAL MEDICINE

## 2024-12-03 PROCEDURE — 84439 ASSAY OF FREE THYROXINE: CPT

## 2024-12-03 PROCEDURE — 6360000004 HC RX CONTRAST MEDICATION: Performed by: PHYSICIAN ASSISTANT

## 2024-12-03 PROCEDURE — 85025 COMPLETE CBC W/AUTO DIFF WBC: CPT

## 2024-12-03 PROCEDURE — 93308 TTE F-UP OR LMTD: CPT | Performed by: INTERNAL MEDICINE

## 2024-12-03 PROCEDURE — 6360000002 HC RX W HCPCS

## 2024-12-03 PROCEDURE — 80053 COMPREHEN METABOLIC PANEL: CPT

## 2024-12-03 PROCEDURE — 99285 EMERGENCY DEPT VISIT HI MDM: CPT

## 2024-12-03 PROCEDURE — 96374 THER/PROPH/DIAG INJ IV PUSH: CPT

## 2024-12-03 PROCEDURE — 4A023N6 MEASUREMENT OF CARDIAC SAMPLING AND PRESSURE, RIGHT HEART, PERCUTANEOUS APPROACH: ICD-10-PCS | Performed by: INTERNAL MEDICINE

## 2024-12-03 PROCEDURE — 94761 N-INVAS EAR/PLS OXIMETRY MLT: CPT

## 2024-12-03 PROCEDURE — 93325 DOPPLER ECHO COLOR FLOW MAPG: CPT | Performed by: INTERNAL MEDICINE

## 2024-12-03 PROCEDURE — 83550 IRON BINDING TEST: CPT

## 2024-12-03 PROCEDURE — 2580000003 HC RX 258: Performed by: INTERNAL MEDICINE

## 2024-12-03 PROCEDURE — 84703 CHORIONIC GONADOTROPIN ASSAY: CPT

## 2024-12-03 PROCEDURE — 84484 ASSAY OF TROPONIN QUANT: CPT

## 2024-12-03 PROCEDURE — 6370000000 HC RX 637 (ALT 250 FOR IP): Performed by: PHYSICIAN ASSISTANT

## 2024-12-03 PROCEDURE — 87086 URINE CULTURE/COLONY COUNT: CPT

## 2024-12-03 PROCEDURE — 93451 RIGHT HEART CATH: CPT | Performed by: INTERNAL MEDICINE

## 2024-12-03 PROCEDURE — 99291 CRITICAL CARE FIRST HOUR: CPT | Performed by: INTERNAL MEDICINE

## 2024-12-03 PROCEDURE — 93005 ELECTROCARDIOGRAM TRACING: CPT | Performed by: EMERGENCY MEDICINE

## 2024-12-03 PROCEDURE — 83540 ASSAY OF IRON: CPT

## 2024-12-03 PROCEDURE — 87502 INFLUENZA DNA AMP PROBE: CPT

## 2024-12-03 PROCEDURE — 82728 ASSAY OF FERRITIN: CPT

## 2024-12-03 PROCEDURE — 81001 URINALYSIS AUTO W/SCOPE: CPT

## 2024-12-03 PROCEDURE — 84443 ASSAY THYROID STIM HORMONE: CPT

## 2024-12-03 PROCEDURE — 6360000002 HC RX W HCPCS: Performed by: FAMILY MEDICINE

## 2024-12-03 PROCEDURE — 6360000002 HC RX W HCPCS: Performed by: INTERNAL MEDICINE

## 2024-12-03 PROCEDURE — 93503 INSERT/PLACE HEART CATHETER: CPT

## 2024-12-03 PROCEDURE — 82803 BLOOD GASES ANY COMBINATION: CPT

## 2024-12-03 PROCEDURE — 87635 SARS-COV-2 COVID-19 AMP PRB: CPT

## 2024-12-03 PROCEDURE — 93308 TTE F-UP OR LMTD: CPT

## 2024-12-03 PROCEDURE — 6360000002 HC RX W HCPCS: Performed by: PHYSICIAN ASSISTANT

## 2024-12-03 PROCEDURE — 2580000003 HC RX 258

## 2024-12-03 RX ORDER — POTASSIUM CHLORIDE 7.45 MG/ML
10 INJECTION INTRAVENOUS PRN
Status: DISCONTINUED | OUTPATIENT
Start: 2024-12-03 | End: 2024-12-09 | Stop reason: HOSPADM

## 2024-12-03 RX ORDER — FERROUS SULFATE 325(65) MG
325 TABLET ORAL
Status: ON HOLD | COMMUNITY
End: 2024-12-09 | Stop reason: HOSPADM

## 2024-12-03 RX ORDER — IPRATROPIUM BROMIDE AND ALBUTEROL SULFATE 2.5; .5 MG/3ML; MG/3ML
1 SOLUTION RESPIRATORY (INHALATION)
Status: DISCONTINUED | OUTPATIENT
Start: 2024-12-03 | End: 2024-12-09 | Stop reason: HOSPADM

## 2024-12-03 RX ORDER — SODIUM CHLORIDE 0.9 % (FLUSH) 0.9 %
5-40 SYRINGE (ML) INJECTION EVERY 12 HOURS SCHEDULED
Status: DISCONTINUED | OUTPATIENT
Start: 2024-12-03 | End: 2024-12-09 | Stop reason: HOSPADM

## 2024-12-03 RX ORDER — HYDROXYZINE HYDROCHLORIDE 25 MG/1
25 TABLET, FILM COATED ORAL EVERY 6 HOURS PRN
COMMUNITY

## 2024-12-03 RX ORDER — METHYLPREDNISOLONE SODIUM SUCCINATE 40 MG/ML
40 INJECTION INTRAMUSCULAR; INTRAVENOUS DAILY
Status: DISCONTINUED | OUTPATIENT
Start: 2024-12-03 | End: 2024-12-09 | Stop reason: HOSPADM

## 2024-12-03 RX ORDER — MIDAZOLAM HYDROCHLORIDE 1 MG/ML
INJECTION, SOLUTION INTRAMUSCULAR; INTRAVENOUS
Status: COMPLETED
Start: 2024-12-03 | End: 2024-12-03

## 2024-12-03 RX ORDER — EMTRICITABINE 200 MG/1
200 CAPSULE ORAL DAILY
Status: DISCONTINUED | OUTPATIENT
Start: 2024-12-03 | End: 2024-12-09 | Stop reason: HOSPADM

## 2024-12-03 RX ORDER — IPRATROPIUM BROMIDE AND ALBUTEROL SULFATE 2.5; .5 MG/3ML; MG/3ML
1 SOLUTION RESPIRATORY (INHALATION) ONCE
Status: COMPLETED | OUTPATIENT
Start: 2024-12-03 | End: 2024-12-03

## 2024-12-03 RX ORDER — IOPAMIDOL 755 MG/ML
75 INJECTION, SOLUTION INTRAVASCULAR
Status: COMPLETED | OUTPATIENT
Start: 2024-12-03 | End: 2024-12-03

## 2024-12-03 RX ORDER — CLOBETASOL PROPIONATE 0.5 MG/G
1 OINTMENT TOPICAL 2 TIMES DAILY
COMMUNITY

## 2024-12-03 RX ORDER — ACETAMINOPHEN 325 MG/1
650 TABLET ORAL EVERY 6 HOURS PRN
Status: DISCONTINUED | OUTPATIENT
Start: 2024-12-03 | End: 2024-12-09 | Stop reason: HOSPADM

## 2024-12-03 RX ORDER — ENOXAPARIN SODIUM 100 MG/ML
40 INJECTION SUBCUTANEOUS DAILY
Status: DISCONTINUED | OUTPATIENT
Start: 2024-12-04 | End: 2024-12-09 | Stop reason: HOSPADM

## 2024-12-03 RX ORDER — FENTANYL CITRATE 50 UG/ML
INJECTION, SOLUTION INTRAMUSCULAR; INTRAVENOUS
Status: COMPLETED
Start: 2024-12-03 | End: 2024-12-03

## 2024-12-03 RX ORDER — DARUNAVIR, COBICISTAT, EMTRICITABINE, AND TENOFOVIR ALAFENAMIDE 800; 150; 200; 10 MG/1; MG/1; MG/1; MG/1
1 TABLET, FILM COATED ORAL DAILY
COMMUNITY

## 2024-12-03 RX ORDER — DARUNAVIR 800 MG/1
800 TABLET, FILM COATED ORAL DAILY
Status: DISCONTINUED | OUTPATIENT
Start: 2024-12-03 | End: 2024-12-09 | Stop reason: HOSPADM

## 2024-12-03 RX ORDER — BUDESONIDE 0.5 MG/2ML
0.5 INHALANT ORAL
Status: DISCONTINUED | OUTPATIENT
Start: 2024-12-03 | End: 2024-12-09 | Stop reason: HOSPADM

## 2024-12-03 RX ORDER — SODIUM CHLORIDE 0.9 % (FLUSH) 0.9 %
5-40 SYRINGE (ML) INJECTION PRN
Status: DISCONTINUED | OUTPATIENT
Start: 2024-12-03 | End: 2024-12-09 | Stop reason: HOSPADM

## 2024-12-03 RX ORDER — WATER 10 ML/10ML
INJECTION INTRAMUSCULAR; INTRAVENOUS; SUBCUTANEOUS
Status: COMPLETED
Start: 2024-12-03 | End: 2024-12-03

## 2024-12-03 RX ORDER — TRIAMCINOLONE ACETONIDE 1 MG/G
1 OINTMENT TOPICAL 2 TIMES DAILY
COMMUNITY

## 2024-12-03 RX ORDER — ALBUTEROL SULFATE 0.83 MG/ML
2.5 SOLUTION RESPIRATORY (INHALATION) EVERY 4 HOURS
Status: DISCONTINUED | OUTPATIENT
Start: 2024-12-03 | End: 2024-12-03

## 2024-12-03 RX ORDER — MAGNESIUM SULFATE IN WATER 40 MG/ML
2000 INJECTION, SOLUTION INTRAVENOUS PRN
Status: DISCONTINUED | OUTPATIENT
Start: 2024-12-03 | End: 2024-12-09 | Stop reason: HOSPADM

## 2024-12-03 RX ORDER — ONDANSETRON 2 MG/ML
4 INJECTION INTRAMUSCULAR; INTRAVENOUS EVERY 6 HOURS PRN
Status: DISCONTINUED | OUTPATIENT
Start: 2024-12-03 | End: 2024-12-09 | Stop reason: HOSPADM

## 2024-12-03 RX ORDER — NITROGLYCERIN 20 MG/100ML
5-200 INJECTION INTRAVENOUS CONTINUOUS
Status: DISCONTINUED | OUTPATIENT
Start: 2024-12-03 | End: 2024-12-05

## 2024-12-03 RX ORDER — LISINOPRIL 5 MG/1
5 TABLET ORAL DAILY
Status: DISCONTINUED | OUTPATIENT
Start: 2024-12-03 | End: 2024-12-03

## 2024-12-03 RX ORDER — ACETAMINOPHEN 650 MG/1
650 SUPPOSITORY RECTAL EVERY 6 HOURS PRN
Status: DISCONTINUED | OUTPATIENT
Start: 2024-12-03 | End: 2024-12-09 | Stop reason: HOSPADM

## 2024-12-03 RX ORDER — TACROLIMUS 1 MG/G
1 OINTMENT TOPICAL 2 TIMES DAILY
COMMUNITY

## 2024-12-03 RX ORDER — DAPSONE 100 MG/1
100 TABLET ORAL DAILY
COMMUNITY
End: 2024-12-03

## 2024-12-03 RX ORDER — DAPSONE 100 MG/1
100 TABLET ORAL DAILY
COMMUNITY

## 2024-12-03 RX ORDER — ONDANSETRON 4 MG/1
4 TABLET, ORALLY DISINTEGRATING ORAL EVERY 8 HOURS PRN
Status: DISCONTINUED | OUTPATIENT
Start: 2024-12-03 | End: 2024-12-09 | Stop reason: HOSPADM

## 2024-12-03 RX ORDER — POLYETHYLENE GLYCOL 3350 17 G/17G
17 POWDER, FOR SOLUTION ORAL DAILY PRN
Status: DISCONTINUED | OUTPATIENT
Start: 2024-12-03 | End: 2024-12-09 | Stop reason: HOSPADM

## 2024-12-03 RX ORDER — FUROSEMIDE 10 MG/ML
40 INJECTION INTRAMUSCULAR; INTRAVENOUS DAILY
Status: DISCONTINUED | OUTPATIENT
Start: 2024-12-03 | End: 2024-12-03

## 2024-12-03 RX ORDER — MIDAZOLAM HYDROCHLORIDE 1 MG/ML
1 INJECTION, SOLUTION INTRAMUSCULAR; INTRAVENOUS ONCE
Status: COMPLETED | OUTPATIENT
Start: 2024-12-03 | End: 2024-12-03

## 2024-12-03 RX ORDER — LIDOCAINE HYDROCHLORIDE 10 MG/ML
INJECTION, SOLUTION EPIDURAL; INFILTRATION; INTRACAUDAL; PERINEURAL
Status: DISCONTINUED
Start: 2024-12-03 | End: 2024-12-03 | Stop reason: WASHOUT

## 2024-12-03 RX ORDER — MILRINONE LACTATE 0.2 MG/ML
.0625-.75 INJECTION, SOLUTION INTRAVENOUS CONTINUOUS
Status: DISCONTINUED | OUTPATIENT
Start: 2024-12-03 | End: 2024-12-04

## 2024-12-03 RX ORDER — POTASSIUM CHLORIDE 1500 MG/1
40 TABLET, EXTENDED RELEASE ORAL PRN
Status: DISCONTINUED | OUTPATIENT
Start: 2024-12-03 | End: 2024-12-09 | Stop reason: HOSPADM

## 2024-12-03 RX ORDER — DIAPER,BRIEF,INFANT-TODD,DISP
1 EACH MISCELLANEOUS 2 TIMES DAILY PRN
COMMUNITY

## 2024-12-03 RX ORDER — FUROSEMIDE 10 MG/ML
20 INJECTION INTRAMUSCULAR; INTRAVENOUS ONCE
Status: COMPLETED | OUTPATIENT
Start: 2024-12-03 | End: 2024-12-03

## 2024-12-03 RX ORDER — SODIUM CHLORIDE 9 MG/ML
INJECTION, SOLUTION INTRAVENOUS PRN
Status: DISCONTINUED | OUTPATIENT
Start: 2024-12-03 | End: 2024-12-09 | Stop reason: HOSPADM

## 2024-12-03 RX ORDER — FENTANYL CITRATE 50 UG/ML
50 INJECTION, SOLUTION INTRAMUSCULAR; INTRAVENOUS ONCE
Status: COMPLETED | OUTPATIENT
Start: 2024-12-03 | End: 2024-12-03

## 2024-12-03 RX ADMIN — COBICISTAT 150 MG: 150 TABLET, FILM COATED ORAL at 16:00

## 2024-12-03 RX ADMIN — IPRATROPIUM BROMIDE AND ALBUTEROL SULFATE 1 DOSE: 2.5; .5 SOLUTION RESPIRATORY (INHALATION) at 20:25

## 2024-12-03 RX ADMIN — MIDAZOLAM 1 MG: 1 INJECTION INTRAMUSCULAR; INTRAVENOUS at 19:22

## 2024-12-03 RX ADMIN — FENTANYL CITRATE 100 MCG: 50 INJECTION INTRAMUSCULAR; INTRAVENOUS at 19:51

## 2024-12-03 RX ADMIN — DARUNAVIR 800 MG: 800 TABLET, FILM COATED ORAL at 16:00

## 2024-12-03 RX ADMIN — IOPAMIDOL 75 ML: 755 INJECTION, SOLUTION INTRAVENOUS at 10:43

## 2024-12-03 RX ADMIN — IPRATROPIUM BROMIDE AND ALBUTEROL SULFATE 1 DOSE: 2.5; .5 SOLUTION RESPIRATORY (INHALATION) at 16:51

## 2024-12-03 RX ADMIN — IPRATROPIUM BROMIDE AND ALBUTEROL SULFATE 1 DOSE: 2.5; .5 SOLUTION RESPIRATORY (INHALATION) at 20:26

## 2024-12-03 RX ADMIN — WATER 1 ML: 1 INJECTION INTRAMUSCULAR; INTRAVENOUS; SUBCUTANEOUS at 17:29

## 2024-12-03 RX ADMIN — IPRATROPIUM BROMIDE AND ALBUTEROL SULFATE 1 DOSE: 2.5; .5 SOLUTION RESPIRATORY (INHALATION) at 09:44

## 2024-12-03 RX ADMIN — FUROSEMIDE 40 MG: 10 INJECTION, SOLUTION INTRAMUSCULAR; INTRAVENOUS at 17:29

## 2024-12-03 RX ADMIN — MILRINONE LACTATE 0.38 MCG/KG/MIN: 0.2 INJECTION, SOLUTION INTRAVENOUS at 19:45

## 2024-12-03 RX ADMIN — TENOFOVIR ALAFENAMIDE 12.5 MG: 25 TABLET ORAL at 15:57

## 2024-12-03 RX ADMIN — LISINOPRIL 5 MG: 5 TABLET ORAL at 13:49

## 2024-12-03 RX ADMIN — FENTANYL CITRATE 100 MCG: 50 INJECTION, SOLUTION INTRAMUSCULAR; INTRAVENOUS at 19:51

## 2024-12-03 RX ADMIN — METHYLPREDNISOLONE SODIUM SUCCINATE 40 MG: 40 INJECTION INTRAMUSCULAR; INTRAVENOUS at 17:29

## 2024-12-03 RX ADMIN — FUROSEMIDE 5 MG/HR: 10 INJECTION, SOLUTION INTRAMUSCULAR; INTRAVENOUS at 19:46

## 2024-12-03 RX ADMIN — NITROGLYCERIN 20 MCG/MIN: 20 INJECTION INTRAVENOUS at 19:43

## 2024-12-03 RX ADMIN — FUROSEMIDE 20 MG: 10 INJECTION, SOLUTION INTRAMUSCULAR; INTRAVENOUS at 11:53

## 2024-12-03 RX ADMIN — EMTRICITABINE 200 MG: 200 CAPSULE ORAL at 15:56

## 2024-12-03 RX ADMIN — MIDAZOLAM HYDROCHLORIDE 1 MG: 1 INJECTION, SOLUTION INTRAMUSCULAR; INTRAVENOUS at 19:22

## 2024-12-03 ASSESSMENT — PAIN - FUNCTIONAL ASSESSMENT
PAIN_FUNCTIONAL_ASSESSMENT: ACTIVITIES ARE NOT PREVENTED
PAIN_FUNCTIONAL_ASSESSMENT: 0-10

## 2024-12-03 ASSESSMENT — LIFESTYLE VARIABLES
HOW MANY STANDARD DRINKS CONTAINING ALCOHOL DO YOU HAVE ON A TYPICAL DAY: PATIENT DOES NOT DRINK
HOW OFTEN DO YOU HAVE A DRINK CONTAINING ALCOHOL: NEVER

## 2024-12-03 ASSESSMENT — PAIN DESCRIPTION - ORIENTATION: ORIENTATION: MID

## 2024-12-03 ASSESSMENT — PAIN SCALES - GENERAL
PAINLEVEL_OUTOF10: 8
PAINLEVEL_OUTOF10: 0

## 2024-12-03 ASSESSMENT — PAIN DESCRIPTION - DESCRIPTORS: DESCRIPTORS: TIGHTNESS

## 2024-12-03 ASSESSMENT — PAIN DESCRIPTION - PAIN TYPE: TYPE: ACUTE PAIN

## 2024-12-03 ASSESSMENT — PAIN DESCRIPTION - LOCATION: LOCATION: CHEST

## 2024-12-03 ASSESSMENT — PAIN DESCRIPTION - FREQUENCY: FREQUENCY: INTERMITTENT

## 2024-12-03 NOTE — DISCHARGE INSTRUCTIONS
Extra Heart Failure Education/ Tools/ Resources:     https://MotherKnows.com/publication/?q=942831   --- this is American Heart Association interactive Healthier Living with Heart Failure guidebook.  Please click hyperlink or copy / paste link into search bar. The QR Code is also available below. Use your mouse to scroll through the pages.  Lots of information about weight monitoring, diet tips, activity, meds, etc    Heart Failure Tools and Resources QR Code is below. It includes multiple resources to include symptom tracker, med tracker, further HF info, and access to a HF Support Network online Community    HF [place] Kenzie  -- this is a free smart phone kenzie available for iPhone and Android download.  Use your phone to track sodium / fluid intake, zone tool symptom tracking, weights, medications, etc. Click on this hyperlink  HF [place] Kenzie   for QR code for easy download or the link is also found in the below HF Tools and Resources.      DASH (Dietary Approach to Stop Hypertension) diet --  https://www.nhlbi.nih.gov/education/dash-eating-plan -- this diet is a flexible eating plan that promotes heart healthy eating style.  Click on hyperlink or copy / paste link into search bar.  Lots of low sodium recipes and tips.    https://www.Pley.Tripbirds/recipes  -- more free recipes

## 2024-12-03 NOTE — ED NOTES
Pt ambulated to restroom to attempt to provide urine sample. Gait is equal and steady, no new complaints.

## 2024-12-03 NOTE — ED PROVIDER NOTES
Result   1. Worsening congestive heart failure.   2. Unchanged small pericardial effusion.             RECORDS REVIEWED   None.    CONSULTS   Hospitalist Dr. Healy, via [employer], 11:49am.    PROCEDURES   None.    EMERGENCY DEPARTMENT COURSE and DIFFERENTIAL DIAGNOSIS/MDM:   Vitals:    Vitals:    12/03/24 1100 12/03/24 1115 12/03/24 1130 12/03/24 1145   BP: (!) 125/90 (!) 124/94 (!) 127/95 121/85   Pulse: (!) 117 (!) 116 (!) 114 (!) 116   Resp: 21 26 22 20   Temp:       TempSrc:       SpO2: 100% 100% 99% 100%   Weight:       Height:           Patient was given the following medications:  Medications   furosemide (LASIX) injection 20 mg (has no administration in time range)   ipratropium 0.5 mg-albuterol 2.5 mg (DUONEB) nebulizer solution 1 Dose (1 Dose Inhalation Given 12/3/24 0944)   iopamidol (ISOVUE-370) 76 % injection 75 mL (75 mLs IntraVENous Given 12/3/24 1043)           Is this patient to be included in the SEP-1 Core Measure due to severe sepsis or septic shock?   No     Exclusion criteria - the patient is NOT to be included for SEP-1 Core Measure due to:  May have criteria for sepsis, but does not meet criteria for severe sepsis or septic shock    Patient was persistently tachycardic and tachypneic in the ED, persistently short of breath, she oxygenating well on room air throughout her stay.  Previous ED visit about 2 weeks ago she was noted to have some pulmonary edema and elevated BNP, was discharged with a week of oral Lasix therapy and advised to follow-up with cardiology, and she completed the medication but has not followed up, now short of breath again.  Workup shows troponin of 30, BNP just over 7000, improved from her last visit, and some mild renal impairment, with a creatinine of 1.2.  Appears to have chronic anemia as well with a hemoglobin of 10.3.  CTPA was negative for PE, but does show small pleural and pericardial effusions.  Urinalysis suggest possible UTI but patient denies any urinary

## 2024-12-03 NOTE — ED TRIAGE NOTES
pt arrives to ED via EMS from home with c/o SOB and chest pain for the past 24 hours. Pt states that she has hx of asthma, and has been using inhaler x 2 days with no relief. Pt states that she was in the hospital on 11/21 and noted that she had fluid and blood clots in lungs. Pt states that she took a water pill, but finished the prescription. Pt denies cough, congestion, fever, chills. Pt denies cardiac hx. Pt states that she was supposed to follow up with cardiology, but has not. Pt has hx of HIV. Pt received 324 mg aspirin en route.  Pt AAO x 4. VSS.

## 2024-12-03 NOTE — H&P
V2.0  History and Physical      Name:  Kiana Walls /Age/Sex: 1980  (44 y.o. female)   MRN & CSN:  [id number] & [id number] Encounter Date/Time: 12/3/2024 1:19 PM EST   Location:  15 PCP: No primary care provider on file.       Hospital Day: 1    Assessment and Plan:   Kiana Walls is a 44 y.o. female with a pmh of asthma and HIV who presents with Acute diastolic CHF (congestive heart failure) ([facility]) and a urinary tract infection    Hospital Problems             Last Modified POA    * (Principal) Acute diastolic CHF (congestive heart failure) ([facility]) 12/3/2024 Yes       Plan:  Acute heart failure  Patient presents with shortness of breath  Patient has no known medical history of heart failure.  proBNP 7129 on presentation  Troponin 30, 28  CT PE shows congestion heart failure with small pericardial effusion  Continuous cardiac monitor  Incentive spirometer  Lasix 40 mg daily  Will obtain a cardiac echo  Cardiology consult    2.  Asthma  Patient is on home albuterol as needed  Will start patient on breathing treatments as patient is wheezy on presentation    3.  HIV  Will resume patient's SYMTUZA       Disposition:   Current Living situation: Home  Expected Disposition: Home  Estimated D/C: 1-2 days    Diet No diet orders on file   DVT Prophylaxis [x] Lovenox, []  Heparin, [] SCDs, [x] Ambulation,  [] Eliquis, [] Xarelto, [] Coumadin   Code Status Full code   Surrogate Decision Maker/ POA Self     Personally reviewed Lab Studies and Imaging           History from:     patient, ED physician, medical records    History of Present Illness:     Chief Complaint: Shortness of breath  Kiana Walls is a 44 y.o. female with pmh of asthma, HIV who presents with acute heart failure    Patient has no known cardiac medical history.  Patient presented with shortness of breath.  Patient had a similar presentation last week where she did present to the ED she was given Lasix at the time and was discharged back to

## 2024-12-04 ENCOUNTER — APPOINTMENT (OUTPATIENT)
Dept: GENERAL RADIOLOGY | Age: 44
DRG: 192 | End: 2024-12-04
Payer: MEDICAID

## 2024-12-04 PROBLEM — I50.21 ACUTE SYSTOLIC (CONGESTIVE) HEART FAILURE (HCC): Status: ACTIVE | Noted: 2024-12-04

## 2024-12-04 LAB
ALBUMIN SERPL-MCNC: 3.1 G/DL (ref 3.4–5)
ALP SERPL-CCNC: 65 U/L (ref 40–129)
ALT SERPL-CCNC: 19 U/L (ref 10–40)
ANION GAP SERPL CALCULATED.3IONS-SCNC: 13 MMOL/L (ref 3–16)
AST SERPL-CCNC: 55 U/L (ref 15–37)
BACTERIA UR CULT: ABNORMAL
BASE EXCESS MIXED: 3
BASE EXCESS MIXED: 4
BASE EXCESS MIXED: 5
BASOPHILS # BLD: 0 K/UL (ref 0–0.2)
BASOPHILS NFR BLD: 1 %
BILIRUB DIRECT SERPL-MCNC: <0.1 MG/DL (ref 0–0.3)
BILIRUB INDIRECT SERPL-MCNC: 0.2 MG/DL (ref 0–1)
BILIRUB SERPL-MCNC: 0.3 MG/DL (ref 0–1)
BUN SERPL-MCNC: 17 MG/DL (ref 7–20)
CALCIUM SERPL-MCNC: 8.1 MG/DL (ref 8.3–10.6)
CHLORIDE SERPL-SCNC: 100 MMOL/L (ref 99–110)
CHOLEST SERPL-MCNC: 239 MG/DL (ref 0–199)
CO2 SERPL-SCNC: 21 MMOL/L (ref 21–32)
CREAT SERPL-MCNC: 1.1 MG/DL (ref 0.6–1.1)
DEPRECATED RDW RBC AUTO: 20.3 % (ref 12.4–15.4)
EKG ATRIAL RATE: 129 BPM
EKG DIAGNOSIS: NORMAL
EKG P AXIS: 64 DEGREES
EKG P-R INTERVAL: 122 MS
EKG Q-T INTERVAL: 394 MS
EKG QRS DURATION: 72 MS
EKG QTC CALCULATION (BAZETT): 577 MS
EKG R AXIS: 96 DEGREES
EKG T AXIS: -82 DEGREES
EKG VENTRICULAR RATE: 129 BPM
EOSINOPHIL # BLD: 0 K/UL (ref 0–0.6)
EOSINOPHIL NFR BLD: 0 %
GFR SERPLBLD CREATININE-BSD FMLA CKD-EPI: 64 ML/MIN/{1.73_M2}
GLUCOSE BLD-MCNC: 165 MG/DL (ref 70–99)
GLUCOSE SERPL-MCNC: 209 MG/DL (ref 70–99)
HCO3, MIXED: 27.3 MMOL/L
HCO3, MIXED: 28 MMOL/L
HCO3, MIXED: 29.1 MMOL/L
HCT VFR BLD AUTO: 29.8 % (ref 36–48)
HCT VFR BLD AUTO: 35 % (ref 36–48)
HDLC SERPL-MCNC: 45 MG/DL (ref 40–60)
HGB BLD CALC-MCNC: 11.8 GM/DL (ref 12–16)
HGB BLD-MCNC: 9.8 G/DL (ref 12–16)
LACTATE BLD-SCNC: 1.24 MMOL/L (ref 0.4–2)
LDLC SERPL CALC-MCNC: 174 MG/DL
LYMPHOCYTES # BLD: 0.5 K/UL (ref 1–5.1)
LYMPHOCYTES NFR BLD: 23.7 %
MAGNESIUM SERPL-MCNC: 1.78 MG/DL (ref 1.8–2.4)
MCH RBC QN AUTO: 27.4 PG (ref 26–34)
MCHC RBC AUTO-ENTMCNC: 32.8 G/DL (ref 31–36)
MCV RBC AUTO: 83.4 FL (ref 80–100)
MONOCYTES # BLD: 0.1 K/UL (ref 0–1.3)
MONOCYTES NFR BLD: 3 %
NEUTROPHILS # BLD: 1.6 K/UL (ref 1.7–7.7)
NEUTROPHILS NFR BLD: 72.3 %
O2 SAT, MIXED: 50 %
O2 SAT, MIXED: 53 %
O2 SAT, MIXED: 69 %
ORGANISM: ABNORMAL
PCO2 MIXED: 41 MM HG
PCO2 MIXED: 43.5 MM HG
PCO2 MIXED: 44.5 MM HG
PERFORMED ON: ABNORMAL
PERFORMED ON: NORMAL
PERFORMED ON: NORMAL
PH, MIXED: 7.41 (ref 7.35–7.45)
PH, MIXED: 7.42 (ref 7.35–7.45)
PH, MIXED: 7.44 (ref 7.35–7.45)
PLATELET # BLD AUTO: 179 K/UL (ref 135–450)
PMV BLD AUTO: 8.5 FL (ref 5–10.5)
PO2 MIXED: 27 MM HG
PO2 MIXED: 28 MM HG
PO2 MIXED: 35 MM HG
POC SAMPLE TYPE: ABNORMAL
POC SAMPLE TYPE: NORMAL
POC SAMPLE TYPE: NORMAL
POTASSIUM SERPL-SCNC: 4.3 MMOL/L (ref 3.5–5.1)
PROT SERPL-MCNC: 6.6 G/DL (ref 6.4–8.2)
RBC # BLD AUTO: 3.58 M/UL (ref 4–5.2)
SODIUM SERPL-SCNC: 134 MMOL/L (ref 136–145)
TCO2 CALC MIXED: 29 MMOL/L
TCO2 CALC MIXED: 29 MMOL/L
TCO2 CALC MIXED: 31 MMOL/L
TRIGL SERPL-MCNC: 102 MG/DL (ref 0–150)
VLDLC SERPL CALC-MCNC: 20 MG/DL
WBC # BLD AUTO: 2.3 K/UL (ref 4–11)

## 2024-12-04 PROCEDURE — 93010 ELECTROCARDIOGRAM REPORT: CPT | Performed by: STUDENT IN AN ORGANIZED HEALTH CARE EDUCATION/TRAINING PROGRAM

## 2024-12-04 PROCEDURE — 94640 AIRWAY INHALATION TREATMENT: CPT

## 2024-12-04 PROCEDURE — 71045 X-RAY EXAM CHEST 1 VIEW: CPT

## 2024-12-04 PROCEDURE — 94760 N-INVAS EAR/PLS OXIMETRY 1: CPT

## 2024-12-04 PROCEDURE — 6370000000 HC RX 637 (ALT 250 FOR IP): Performed by: FAMILY MEDICINE

## 2024-12-04 PROCEDURE — 6360000002 HC RX W HCPCS: Performed by: INTERNAL MEDICINE

## 2024-12-04 PROCEDURE — 2580000003 HC RX 258: Performed by: INTERNAL MEDICINE

## 2024-12-04 PROCEDURE — 2500000003 HC RX 250 WO HCPCS: Performed by: INTERNAL MEDICINE

## 2024-12-04 PROCEDURE — 6370000000 HC RX 637 (ALT 250 FOR IP): Performed by: NURSE PRACTITIONER

## 2024-12-04 PROCEDURE — 82803 BLOOD GASES ANY COMBINATION: CPT

## 2024-12-04 PROCEDURE — 94150 VITAL CAPACITY TEST: CPT

## 2024-12-04 PROCEDURE — 85025 COMPLETE CBC W/AUTO DIFF WBC: CPT

## 2024-12-04 PROCEDURE — 2580000003 HC RX 258: Performed by: FAMILY MEDICINE

## 2024-12-04 PROCEDURE — 83605 ASSAY OF LACTIC ACID: CPT

## 2024-12-04 PROCEDURE — 80076 HEPATIC FUNCTION PANEL: CPT

## 2024-12-04 PROCEDURE — 83735 ASSAY OF MAGNESIUM: CPT

## 2024-12-04 PROCEDURE — 2100000000 HC CCU R&B

## 2024-12-04 PROCEDURE — 82947 ASSAY GLUCOSE BLOOD QUANT: CPT

## 2024-12-04 PROCEDURE — 6360000002 HC RX W HCPCS: Performed by: FAMILY MEDICINE

## 2024-12-04 PROCEDURE — 99291 CRITICAL CARE FIRST HOUR: CPT | Performed by: STUDENT IN AN ORGANIZED HEALTH CARE EDUCATION/TRAINING PROGRAM

## 2024-12-04 PROCEDURE — 80061 LIPID PANEL: CPT

## 2024-12-04 PROCEDURE — 6370000000 HC RX 637 (ALT 250 FOR IP): Performed by: INTERNAL MEDICINE

## 2024-12-04 PROCEDURE — 80048 BASIC METABOLIC PNL TOTAL CA: CPT

## 2024-12-04 PROCEDURE — 85014 HEMATOCRIT: CPT

## 2024-12-04 PROCEDURE — 6370000000 HC RX 637 (ALT 250 FOR IP): Performed by: STUDENT IN AN ORGANIZED HEALTH CARE EDUCATION/TRAINING PROGRAM

## 2024-12-04 RX ORDER — MIDAZOLAM HYDROCHLORIDE 1 MG/ML
1 INJECTION, SOLUTION INTRAMUSCULAR; INTRAVENOUS ONCE
Status: COMPLETED | OUTPATIENT
Start: 2024-12-03 | End: 2024-12-03

## 2024-12-04 RX ORDER — ATORVASTATIN CALCIUM 40 MG/1
40 TABLET, FILM COATED ORAL NIGHTLY
Status: DISCONTINUED | OUTPATIENT
Start: 2024-12-04 | End: 2024-12-09 | Stop reason: HOSPADM

## 2024-12-04 RX ORDER — DEXMEDETOMIDINE HYDROCHLORIDE 4 UG/ML
.1-1.5 INJECTION, SOLUTION INTRAVENOUS CONTINUOUS
Status: DISCONTINUED | OUTPATIENT
Start: 2024-12-04 | End: 2024-12-09 | Stop reason: HOSPADM

## 2024-12-04 RX ORDER — DIPHENHYDRAMINE HYDROCHLORIDE 50 MG/ML
25 INJECTION INTRAMUSCULAR; INTRAVENOUS EVERY 6 HOURS PRN
Status: DISCONTINUED | OUTPATIENT
Start: 2024-12-04 | End: 2024-12-09 | Stop reason: HOSPADM

## 2024-12-04 RX ORDER — CARVEDILOL 3.12 MG/1
3.12 TABLET ORAL 2 TIMES DAILY WITH MEALS
Status: DISCONTINUED | OUTPATIENT
Start: 2024-12-04 | End: 2024-12-05

## 2024-12-04 RX ORDER — METHYLPREDNISOLONE SODIUM SUCCINATE 40 MG/ML
INJECTION INTRAMUSCULAR; INTRAVENOUS
Status: DISPENSED
Start: 2024-12-04 | End: 2024-12-04

## 2024-12-04 RX ORDER — SPIRONOLACTONE 25 MG/1
25 TABLET ORAL DAILY
Status: DISCONTINUED | OUTPATIENT
Start: 2024-12-04 | End: 2024-12-09 | Stop reason: HOSPADM

## 2024-12-04 RX ORDER — MILRINONE LACTATE 0.2 MG/ML
.0625-.75 INJECTION, SOLUTION INTRAVENOUS CONTINUOUS
Status: DISCONTINUED | OUTPATIENT
Start: 2024-12-04 | End: 2024-12-05

## 2024-12-04 RX ORDER — OXYBUTYNIN CHLORIDE 10 MG/1
10 TABLET, EXTENDED RELEASE ORAL NIGHTLY
Status: DISCONTINUED | OUTPATIENT
Start: 2024-12-04 | End: 2024-12-09 | Stop reason: HOSPADM

## 2024-12-04 RX ADMIN — SODIUM CHLORIDE, PRESERVATIVE FREE 10 ML: 5 INJECTION INTRAVENOUS at 20:51

## 2024-12-04 RX ADMIN — IPRATROPIUM BROMIDE AND ALBUTEROL SULFATE 1 DOSE: 2.5; .5 SOLUTION RESPIRATORY (INHALATION) at 17:08

## 2024-12-04 RX ADMIN — ACETAMINOPHEN 650 MG: 325 TABLET ORAL at 00:42

## 2024-12-04 RX ADMIN — IPRATROPIUM BROMIDE AND ALBUTEROL SULFATE 1 DOSE: 2.5; .5 SOLUTION RESPIRATORY (INHALATION) at 09:33

## 2024-12-04 RX ADMIN — NITROGLYCERIN 200 MCG/MIN: 20 INJECTION INTRAVENOUS at 04:37

## 2024-12-04 RX ADMIN — CARVEDILOL 3.12 MG: 3.12 TABLET, FILM COATED ORAL at 15:54

## 2024-12-04 RX ADMIN — CARVEDILOL 3.12 MG: 3.12 TABLET, FILM COATED ORAL at 09:15

## 2024-12-04 RX ADMIN — DARUNAVIR 800 MG: 800 TABLET, FILM COATED ORAL at 08:18

## 2024-12-04 RX ADMIN — SPIRONOLACTONE 25 MG: 25 TABLET ORAL at 15:53

## 2024-12-04 RX ADMIN — METHYLPREDNISOLONE SODIUM SUCCINATE 40 MG: 40 INJECTION INTRAMUSCULAR; INTRAVENOUS at 08:20

## 2024-12-04 RX ADMIN — NITROGLYCERIN 90 MCG/MIN: 20 INJECTION INTRAVENOUS at 15:50

## 2024-12-04 RX ADMIN — NITROGLYCERIN 175 MCG/MIN: 20 INJECTION INTRAVENOUS at 09:27

## 2024-12-04 RX ADMIN — FUROSEMIDE 5 MG/HR: 10 INJECTION, SOLUTION INTRAMUSCULAR; INTRAVENOUS at 08:36

## 2024-12-04 RX ADMIN — ATORVASTATIN CALCIUM 40 MG: 40 TABLET, FILM COATED ORAL at 20:51

## 2024-12-04 RX ADMIN — TENOFOVIR ALAFENAMIDE 12.5 MG: 25 TABLET ORAL at 08:18

## 2024-12-04 RX ADMIN — MILRINONE LACTATE 0.38 MCG/KG/MIN: 0.2 INJECTION, SOLUTION INTRAVENOUS at 08:32

## 2024-12-04 RX ADMIN — SODIUM CHLORIDE 1.25 MCG/KG/MIN: 9 INJECTION, SOLUTION INTRAVENOUS at 22:19

## 2024-12-04 RX ADMIN — EMTRICITABINE 200 MG: 200 CAPSULE ORAL at 08:18

## 2024-12-04 RX ADMIN — COBICISTAT 150 MG: 150 TABLET, FILM COATED ORAL at 08:19

## 2024-12-04 RX ADMIN — ACETAMINOPHEN 650 MG: 325 TABLET ORAL at 18:44

## 2024-12-04 RX ADMIN — OXYBUTYNIN CHLORIDE 10 MG: 10 TABLET, EXTENDED RELEASE ORAL at 23:16

## 2024-12-04 RX ADMIN — SODIUM CHLORIDE 0.25 MCG/KG/MIN: 9 INJECTION, SOLUTION INTRAVENOUS at 06:52

## 2024-12-04 RX ADMIN — SODIUM CHLORIDE, PRESERVATIVE FREE 10 ML: 5 INJECTION INTRAVENOUS at 08:21

## 2024-12-04 RX ADMIN — BUDESONIDE 500 MCG: 0.5 SUSPENSION RESPIRATORY (INHALATION) at 09:33

## 2024-12-04 RX ADMIN — ACETAMINOPHEN 650 MG: 325 TABLET ORAL at 09:15

## 2024-12-04 RX ADMIN — DIPHENHYDRAMINE HYDROCHLORIDE 25 MG: 50 INJECTION INTRAMUSCULAR; INTRAVENOUS at 12:15

## 2024-12-04 ASSESSMENT — PAIN DESCRIPTION - ORIENTATION
ORIENTATION: LEFT
ORIENTATION: LEFT

## 2024-12-04 ASSESSMENT — PAIN DESCRIPTION - LOCATION
LOCATION: SHOULDER
LOCATION: SHOULDER
LOCATION: OTHER (COMMENT)

## 2024-12-04 ASSESSMENT — PAIN DESCRIPTION - DESCRIPTORS
DESCRIPTORS: ACHING;DISCOMFORT

## 2024-12-04 ASSESSMENT — PAIN - FUNCTIONAL ASSESSMENT: PAIN_FUNCTIONAL_ASSESSMENT: ACTIVITIES ARE NOT PREVENTED

## 2024-12-04 ASSESSMENT — PAIN SCALES - GENERAL
PAINLEVEL_OUTOF10: 2
PAINLEVEL_OUTOF10: 7
PAINLEVEL_OUTOF10: 1
PAINLEVEL_OUTOF10: 7

## 2024-12-04 NOTE — FLOWSHEET NOTE
12/04/24 0033   [place] [facility]   PAP (Systolic/Diastolic) 52/30   PAP (Mean) 37 mmHg   CVP (Mean) 13 mmHg   SVO2 (%) 52.7 %   CO (l/min) 3.3 l/min   CCO 3.3 L/Min   CI (l/min/m2) 2 l/min/m2   SVR (Using NBP Mean) 2036.36 dyne*sec/cm5   GENOVEVA 1.69    0.71   SV (ml) 34 ml     Calculated using Forrest Score    Nitro 70mcg/min  Milrinone 0.375mcg/kg/min  Lasix 5mg/hr

## 2024-12-04 NOTE — FLOWSHEET NOTE
12/03/24 1940   Vitals   Pulse (!) 112   Respirations 20   BP (!) 113/94   MAP (Calculated) 100   MAP (mmHg) 101   [place]-[facility]   PAP (Systolic/Diastolic) 50/30   PAP (Mean) 37 mmHg   CVP (Mean) 9 mmHg   PCWP (mmHg) 26 mmHg   SVO2 (%) 50 %   CO (l/min) 3 l/min   CCO 3 L/Min   CI (l/min/m2) 1.8 l/min/m2   SVR (Using NBP Mean) 2453.33 dyne*sec/cm5   GENOVEVA 2.22    0.67     Orders to start nitro @ 20mcg for MAP <80, Milrinone @ 0.375, and lasix @ 5mg/hr. MVO2 Q6 per Dr. Munguia

## 2024-12-04 NOTE — FLOWSHEET NOTE
12/04/24 0555   [place]-[facility]   PAP (Systolic/Diastolic) 34/23   PAP (Mean) 26 mmHg   CVP (Mean) 8 mmHg   SVO2 (%) 50 %   CO (l/min) 2.9 l/min   CCO 2.9 L/Min   CI (l/min/m2) 1.7 l/min/m2   SVR (Using NBP Mean) 2262.07 dyne*sec/cm5   GENOVEVA 1.38    0.58   SV (ml) 25 ml     Calculated using Forrest Score  Nitro 200mcg/min  Milrinone 0.375/mcg/min  Lasix 5mg.hr

## 2024-12-04 NOTE — CARE COORDINATION
Chart Reviewed.  MEt with pateint to introduce  role, assess dc needs and to speak about recommendation for pcp.    She is alert and oriented.  Tearful when talking about her experience prior to admit whether or not to call 911.  She was alone at the time and didn't want to come to the hospital.  She began to see and hear her  grandmother who told her to call 911.    Her goal is to return home.  She lives alone, has a sister and brother for support if needed.  She has no pcp but is receptive to having one set up for her.    Arranged for pcp appt with Mai Gross on Dec 27 at 10 am.    Goal is home.    ESTRELLITA Willaimson     Case Management   [phone]    2024  2:30 PM

## 2024-12-04 NOTE — FLOWSHEET NOTE
12/04/24 0033   [place]-[facility]ardo   PAP (Systolic/Diastolic) 52/30   PAP (Mean) 37 mmHg   CVP (Mean) 13 mmHg   SVO2 (%) 52.7 %   CO (l/min) 2.7 l/min   CCO 2.7 L/Min   CI (l/min/m2) 1.6 l/min/m2   SVR (Using NBP Mean) 2488.89 dyne*sec/cm5   GENOVEVA 1.69   SV (ml) 26 ml     Calculated using Forrest  Nitro 70mcg/min  Milrinone 0.375mg/min  Lasix 5mg/hr

## 2024-12-04 NOTE — CONSULTS
Interventional Cardiology Consultation     Kiana Walls  1980    PCP: No primary care provider on file.  Referring Physician: Dr. Melgoza  Reason for Referral: CHF  Chief Complaint:   Chief Complaint   Patient presents with    Shortness of Breath     pt arrives to ED via EMS from home with c/o SOB and chest pain for the past 24 hours. Pt states that she has hx of asthma, and has been using inhaler x 2 days with no relief. Pt states that she was in the hospital on 11/21 and noted that she had fluid and blood clots in lungs. Pt states that she took a water pill, but finished the prescription. Pt denies cough, congestion, fever, chills. Pt denies cardiac hx. Pt AAO x 4. VSS.       Subjective:     History of Present Illness: The patient is 44 y.o. female with a past medical history significant for HIV, who presents above complaint.  She notes to roughly 3 weeks of worsening shortness of breath.  She was recently evaluated emergency was Hospital ER was given Lasix and discharged with cardiology follow-up.  She notes over the last 48 hours she has been unable to sleep at night and has to sit up.  She is unable to walk more than a few steps up, markedly breathless.  She has no history of similar complaints.  She has intermittent substernal chest discomfort.  That is both with a exertion and rest.  She is currently sitting on the side of her bed and markedly breathless.  She is currently on 6 L nasal cannula.  I was asked to see urgently because of worsening hypoxemia.          Past Medical History:   Diagnosis Date    Asthma     Folliculitis     HIV (human immunodeficiency virus infection) (HCC)     HIV (human immunodeficiency virus infection) (HCC)      Past Surgical History:   Procedure Laterality Date    LYMPH NODE BIOPSY       History reviewed. No pertinent family history.  Social History     Tobacco Use    Smoking status: Former     Current packs/day: 0.50     Types: Cigarettes    Smokeless

## 2024-12-04 NOTE — FLOWSHEET NOTE
12/04/24 1157   [place]-[facility] Eduardo   PAP (Systolic/Diastolic) 26/8   PAP (Mean) 14 mmHg   CVP (Mean) 4 mmHg   SVO2 (%) 68.7 %   CO (l/min) 6.2 l/min   CCO 6.2 L/Min   CI (l/min/m2) 3.6 l/min/m2   SVR (Using NBP Mean) 800 dyne*sec/cm5   GENOVEVA 4.5    0.91     Nitro @ 130 mcg/min  Nipride 0.5 mcg/kg/min  Milrinone 0.375 mcg/kg/min  Lasix 5 mL/hr   Latest Reference Range & Units 12/04/24 11:57   PCO2, Mixed Not Established mm Hg 41.0   PO2, Mixed Not Established mm Hg 35   HCO3, Mixed Not Established mmol/L 28.0   Base Exc, Mixed Not Established  4   O2 Sat, Mixed Not Established % 69   tCO2, Mixed Not Established mmol/L 29   PH MIXED 7.350 - 7.450  7.442   Sample Type  MIXED

## 2024-12-04 NOTE — FLOWSHEET NOTE
12/04/24 0555   [place]-[facility]ardo   PAP (Systolic/Diastolic) 34/23   PAP (Mean) 26 mmHg   CVP (Mean) 8 mmHg   SVO2 (%) 50 %   CO (l/min) 2.6 l/min   CCO 2.6 L/Min   CI (l/min/m2) 1.5 l/min/m2   SVR (Using NBP Mean) 2523.08 dyne*sec/cm5   GENOVEVA 1.38    0.52   SV (ml) 25 ml     Nitro 200 mcg/min  Milrinone 0.375   Lasix 5 mg/hr

## 2024-12-04 NOTE — OP NOTE
[facility]   Procedure Note    CLINICAL HISTORY:       Kiana Walls is a 44 y.o. female with a history of HIV with acute systolic heart failure     Patient Active Problem List   Diagnosis   • Acute diastolic CHF (congestive heart failure) (HCC)       Current Facility-Administered Medications   Medication Dose Route Frequency Provider Last Rate Last Admin   • sodium chloride flush 0.9 % injection 5-40 mL  5-40 mL IntraVENous 2 times per day Robert Melgoza MD       • sodium chloride flush 0.9 % injection 5-40 mL  5-40 mL IntraVENous PRN Robert Melgoza MD       • 0.9 % sodium chloride infusion   IntraVENous PRN Robert Melgoza MD       • ondansetron (ZOFRAN-ODT) disintegrating tablet 4 mg  4 mg Oral Q8H PRN Robert Melgoza MD        Or   • ondansetron (ZOFRAN) injection 4 mg  4 mg IntraVENous Q6H PRN Robert Melgoza MD       • polyethylene glycol (GLYCOLAX) packet 17 g  17 g Oral Daily PRN Robert Melgoza MD       • acetaminophen (TYLENOL) tablet 650 mg  650 mg Oral Q6H PRN Robert Melgoza MD        Or   • acetaminophen (TYLENOL) suppository 650 mg  650 mg Rectal Q6H PRN Robert Melgoza MD       • [START ON 12/4/2024] enoxaparin (LOVENOX) injection 40 mg  40 mg SubCUTAneous Daily Robert Melgoza MD       • potassium chloride (KLOR-CON M) extended release tablet 40 mEq  40 mEq Oral PRN Robert Melgoza MD        Or   • potassium bicarb-citric acid (EFFER-K) effervescent tablet 40 mEq  40 mEq Oral PRN Robert Melgoza MD        Or   • potassium chloride 10 mEq/100 mL IVPB (Peripheral Line)  10 mEq IntraVENous PRN Robert Melgoza MD       • magnesium sulfate 2000 mg in 50 mL IVPB premix  2,000 mg IntraVENous PRN Robert Melgoza MD       • sulfur hexafluoride microspheres (LUMASON) 60.7-25 MG injection 2 mL  2 mL IntraVENous ONCE PRN Robert Melgoza MD       • ipratropium 0.5 mg-albuterol 2.5 mg (DUONEB) nebulizer solution 1 Dose  1 Dose Inhalation Q4H WA RT Robert Melgoza MD   1

## 2024-12-04 NOTE — SEDATION DOCUMENTATION
MD        [START ON 12/4/2024] enoxaparin (LOVENOX) injection 40 mg  40 mg SubCUTAneous Daily Robert Melgoza MD        potassium chloride (KLOR-CON M) extended release tablet 40 mEq  40 mEq Oral PRN Robert Melgoza MD        Or    potassium bicarb-citric acid (EFFER-K) effervescent tablet 40 mEq  40 mEq Oral PRN Robert Melgoza MD        Or    potassium chloride 10 mEq/100 mL IVPB (Peripheral Line)  10 mEq IntraVENous PRN Robert Melgoza MD        magnesium sulfate 2000 mg in 50 mL IVPB premix  2,000 mg IntraVENous PRN Robert Melgoza MD        sulfur hexafluoride microspheres (LUMASON) 60.7-25 MG injection 2 mL  2 mL IntraVENous ONCE PRN Robert Melgoza MD        ipratropium 0.5 mg-albuterol 2.5 mg (DUONEB) nebulizer solution 1 Dose  1 Dose Inhalation Q4H WA RT Robert Melgoza MD   1 Dose at 12/03/24 2026    darunavir (PREZISTA) tablet 800 mg  800 mg Oral Daily Robert Melgoza MD   800 mg at 12/03/24 1600    And    cobicistat (TYBOST) tablet 150 mg  150 mg Oral Daily Robert Melgoza MD   150 mg at 12/03/24 1600    And    emtricitabine (EMTRIVA) capsule 200 mg  200 mg Oral Daily Robert Melgoza MD   200 mg at 12/03/24 1556    And    Tenofovir Alafenamide Fumarate TABS 12.5 mg  12.5 mg Oral Daily Robert Melgoza MD   12.5 mg at 12/03/24 1557    methylPREDNISolone sodium succ (SOLU-MEDROL) injection 40 mg  40 mg IntraVENous Daily Robert Melgoza MD   40 mg at 12/03/24 1729    budesonide (PULMICORT) nebulizer suspension 500 mcg  0.5 mg Nebulization BID RT Robert Melgoza MD        ipratropium (ATROVENT) 0.02 % nebulizer solution 0.5 mg  0.5 mg Nebulization Q4H PRN Robert Melgoza MD        furosemide (LASIX) 100 mg in sodium chloride 0.9 % 100 mL infusion  5 mg/hr IntraVENous Continuous Ezra, Mendez B, MD 5 mL/hr at 12/03/24 2202 5 mg/hr at 12/03/24 2202    nitroGLYCERIN 200 mcg/mL in dextrose 5%  5-200 mcg/min IntraVENous Continuous Mendez Munguia MD 12 mL/hr at 12/03/24 2202 40 mcg/min

## 2024-12-05 LAB
ANION GAP SERPL CALCULATED.3IONS-SCNC: 7 MMOL/L (ref 3–16)
BASE EXCESS BLDV CALC-SCNC: 4 MMOL/L (ref -3–3)
BASE EXCESS MIXED: 3
BASE EXCESS MIXED: 4
BASE EXCESS MIXED: 5
BASE EXCESS MIXED: 6
BUN SERPL-MCNC: 25 MG/DL (ref 7–20)
CALCIUM SERPL-MCNC: 8.5 MG/DL (ref 8.3–10.6)
CHLORIDE SERPL-SCNC: 104 MMOL/L (ref 99–110)
CO2 BLDV-SCNC: 31 MMOL/L
CO2 SERPL-SCNC: 27 MMOL/L (ref 21–32)
CREAT SERPL-MCNC: 0.9 MG/DL (ref 0.6–1.1)
GFR SERPLBLD CREATININE-BSD FMLA CKD-EPI: 81 ML/MIN/{1.73_M2}
GLUCOSE SERPL-MCNC: 143 MG/DL (ref 70–99)
HCO3 BLDV-SCNC: 29.1 MMOL/L (ref 23–29)
HCO3, MIXED: 25.7 MMOL/L
HCO3, MIXED: 28.5 MMOL/L
HCO3, MIXED: 29.5 MMOL/L
HCO3, MIXED: 29.8 MMOL/L
MAGNESIUM SERPL-MCNC: 1.74 MG/DL (ref 1.8–2.4)
NT-PROBNP SERPL-MCNC: 1176 PG/ML (ref 0–124)
O2 SAT, MIXED: 56 %
O2 SAT, MIXED: 60 %
O2 SAT, MIXED: 61 %
O2 SAT, MIXED: 65 %
O2 SAT, MIXED: 69 %
O2 SAT, MIXED: 88 %
PCO2 BLDV: 48.7 MM HG (ref 40–50)
PCO2 MIXED: 32.3 MM HG
PCO2 MIXED: 40.8 MM HG
PCO2 MIXED: 42.7 MM HG
PCO2 MIXED: 45.8 MM HG
PCO2 MIXED: 46 MM HG
PCO2 MIXED: 47 MM HG
PERFORMED ON: ABNORMAL
PERFORMED ON: NORMAL
PH BLDV: 7.38 [PH] (ref 7.35–7.45)
PH, MIXED: 7.39 (ref 7.35–7.45)
PH, MIXED: 7.4 (ref 7.35–7.45)
PH, MIXED: 7.42 (ref 7.35–7.45)
PH, MIXED: 7.43 (ref 7.35–7.45)
PH, MIXED: 7.47 (ref 7.35–7.45)
PH, MIXED: 7.51 (ref 7.35–7.45)
PO2 BLDV: 30 MM HG
PO2 MIXED: 28 MM HG
PO2 MIXED: 31 MM HG
PO2 MIXED: 32 MM HG
PO2 MIXED: 35 MM HG
PO2 MIXED: 36 MM HG
PO2 MIXED: 48 MM HG
POC SAMPLE TYPE: ABNORMAL
POC SAMPLE TYPE: NORMAL
POTASSIUM SERPL-SCNC: 4.3 MMOL/L (ref 3.5–5.1)
SAO2 % BLDV: 55 %
SODIUM SERPL-SCNC: 138 MMOL/L (ref 136–145)
TCO2 CALC MIXED: 27 MMOL/L
TCO2 CALC MIXED: 30 MMOL/L
TCO2 CALC MIXED: 31 MMOL/L
TCO2 CALC MIXED: 31 MMOL/L

## 2024-12-05 PROCEDURE — 6360000002 HC RX W HCPCS: Performed by: FAMILY MEDICINE

## 2024-12-05 PROCEDURE — 6370000000 HC RX 637 (ALT 250 FOR IP): Performed by: FAMILY MEDICINE

## 2024-12-05 PROCEDURE — 6360000002 HC RX W HCPCS: Performed by: INTERNAL MEDICINE

## 2024-12-05 PROCEDURE — 2500000003 HC RX 250 WO HCPCS: Performed by: INTERNAL MEDICINE

## 2024-12-05 PROCEDURE — 83880 ASSAY OF NATRIURETIC PEPTIDE: CPT

## 2024-12-05 PROCEDURE — 82803 BLOOD GASES ANY COMBINATION: CPT

## 2024-12-05 PROCEDURE — 94640 AIRWAY INHALATION TREATMENT: CPT

## 2024-12-05 PROCEDURE — 83735 ASSAY OF MAGNESIUM: CPT

## 2024-12-05 PROCEDURE — 2100000000 HC CCU R&B

## 2024-12-05 PROCEDURE — 99233 SBSQ HOSP IP/OBS HIGH 50: CPT | Performed by: INTERNAL MEDICINE

## 2024-12-05 PROCEDURE — 6360000002 HC RX W HCPCS: Performed by: STUDENT IN AN ORGANIZED HEALTH CARE EDUCATION/TRAINING PROGRAM

## 2024-12-05 PROCEDURE — 6370000000 HC RX 637 (ALT 250 FOR IP): Performed by: INTERNAL MEDICINE

## 2024-12-05 PROCEDURE — 2580000003 HC RX 258: Performed by: FAMILY MEDICINE

## 2024-12-05 PROCEDURE — 6370000000 HC RX 637 (ALT 250 FOR IP): Performed by: STUDENT IN AN ORGANIZED HEALTH CARE EDUCATION/TRAINING PROGRAM

## 2024-12-05 PROCEDURE — 94760 N-INVAS EAR/PLS OXIMETRY 1: CPT

## 2024-12-05 PROCEDURE — 80048 BASIC METABOLIC PNL TOTAL CA: CPT

## 2024-12-05 PROCEDURE — 2580000003 HC RX 258: Performed by: INTERNAL MEDICINE

## 2024-12-05 PROCEDURE — 6370000000 HC RX 637 (ALT 250 FOR IP): Performed by: NURSE PRACTITIONER

## 2024-12-05 RX ORDER — PHENAZOPYRIDINE HYDROCHLORIDE 100 MG/1
100 TABLET, FILM COATED ORAL 3 TIMES DAILY PRN
Status: DISCONTINUED | OUTPATIENT
Start: 2024-12-05 | End: 2024-12-09 | Stop reason: HOSPADM

## 2024-12-05 RX ORDER — CARVEDILOL 12.5 MG/1
12.5 TABLET ORAL 2 TIMES DAILY WITH MEALS
Status: DISCONTINUED | OUTPATIENT
Start: 2024-12-06 | End: 2024-12-06

## 2024-12-05 RX ORDER — HYDRALAZINE HYDROCHLORIDE 50 MG/1
50 TABLET, FILM COATED ORAL EVERY 12 HOURS SCHEDULED
Status: DISCONTINUED | OUTPATIENT
Start: 2024-12-05 | End: 2024-12-06

## 2024-12-05 RX ORDER — WATER 10 ML/10ML
INJECTION INTRAMUSCULAR; INTRAVENOUS; SUBCUTANEOUS
Status: DISPENSED
Start: 2024-12-05 | End: 2024-12-05

## 2024-12-05 RX ORDER — ZOLPIDEM TARTRATE 5 MG/1
10 TABLET ORAL NIGHTLY PRN
Status: DISCONTINUED | OUTPATIENT
Start: 2024-12-05 | End: 2024-12-05

## 2024-12-05 RX ORDER — CARVEDILOL 6.25 MG/1
6.25 TABLET ORAL 2 TIMES DAILY WITH MEALS
Status: DISCONTINUED | OUTPATIENT
Start: 2024-12-05 | End: 2024-12-05

## 2024-12-05 RX ORDER — CARVEDILOL 3.12 MG/1
3.12 TABLET ORAL ONCE
Status: DISCONTINUED | OUTPATIENT
Start: 2024-12-05 | End: 2024-12-07

## 2024-12-05 RX ORDER — FUROSEMIDE 40 MG/1
40 TABLET ORAL DAILY
Status: DISCONTINUED | OUTPATIENT
Start: 2024-12-06 | End: 2024-12-09 | Stop reason: HOSPADM

## 2024-12-05 RX ORDER — NITROGLYCERIN 20 MG/100ML
5-200 INJECTION INTRAVENOUS CONTINUOUS
Status: DISCONTINUED | OUTPATIENT
Start: 2024-12-05 | End: 2024-12-09 | Stop reason: HOSPADM

## 2024-12-05 RX ADMIN — TENOFOVIR ALAFENAMIDE 12.5 MG: 25 TABLET ORAL at 09:02

## 2024-12-05 RX ADMIN — IPRATROPIUM BROMIDE AND ALBUTEROL SULFATE 1 DOSE: 2.5; .5 SOLUTION RESPIRATORY (INHALATION) at 12:41

## 2024-12-05 RX ADMIN — BUDESONIDE 500 MCG: 0.5 SUSPENSION RESPIRATORY (INHALATION) at 08:19

## 2024-12-05 RX ADMIN — SACUBITRIL AND VALSARTAN 1 TABLET: 24; 26 TABLET, FILM COATED ORAL at 09:01

## 2024-12-05 RX ADMIN — NITROGLYCERIN 80 MCG/MIN: 20 INJECTION INTRAVENOUS at 20:24

## 2024-12-05 RX ADMIN — SODIUM CHLORIDE 1.75 MCG/KG/MIN: 9 INJECTION, SOLUTION INTRAVENOUS at 10:39

## 2024-12-05 RX ADMIN — DARUNAVIR 800 MG: 800 TABLET, FILM COATED ORAL at 09:02

## 2024-12-05 RX ADMIN — IPRATROPIUM BROMIDE AND ALBUTEROL SULFATE 1 DOSE: 2.5; .5 SOLUTION RESPIRATORY (INHALATION) at 17:01

## 2024-12-05 RX ADMIN — METHYLPREDNISOLONE SODIUM SUCCINATE 40 MG: 40 INJECTION INTRAMUSCULAR; INTRAVENOUS at 09:06

## 2024-12-05 RX ADMIN — SODIUM CHLORIDE 1.75 MCG/KG/MIN: 9 INJECTION, SOLUTION INTRAVENOUS at 18:23

## 2024-12-05 RX ADMIN — NITROGLYCERIN 80 MCG/MIN: 20 INJECTION INTRAVENOUS at 01:16

## 2024-12-05 RX ADMIN — SPIRONOLACTONE 25 MG: 25 TABLET ORAL at 09:01

## 2024-12-05 RX ADMIN — NITROGLYCERIN 100 MCG/MIN: 20 INJECTION INTRAVENOUS at 12:04

## 2024-12-05 RX ADMIN — ACETAMINOPHEN 650 MG: 325 TABLET ORAL at 19:54

## 2024-12-05 RX ADMIN — SODIUM CHLORIDE, PRESERVATIVE FREE 10 ML: 5 INJECTION INTRAVENOUS at 19:49

## 2024-12-05 RX ADMIN — DIPHENHYDRAMINE HYDROCHLORIDE 25 MG: 50 INJECTION INTRAMUSCULAR; INTRAVENOUS at 22:53

## 2024-12-05 RX ADMIN — SODIUM CHLORIDE, PRESERVATIVE FREE 10 ML: 5 INJECTION INTRAVENOUS at 09:09

## 2024-12-05 RX ADMIN — ATORVASTATIN CALCIUM 40 MG: 40 TABLET, FILM COATED ORAL at 19:48

## 2024-12-05 RX ADMIN — PHENAZOPYRIDINE 100 MG: 100 TABLET ORAL at 00:56

## 2024-12-05 RX ADMIN — CARVEDILOL 3.12 MG: 3.12 TABLET, FILM COATED ORAL at 09:01

## 2024-12-05 RX ADMIN — OXYBUTYNIN CHLORIDE 10 MG: 10 TABLET, EXTENDED RELEASE ORAL at 19:48

## 2024-12-05 RX ADMIN — HYDRALAZINE HYDROCHLORIDE 50 MG: 50 TABLET ORAL at 19:48

## 2024-12-05 RX ADMIN — IPRATROPIUM BROMIDE AND ALBUTEROL SULFATE 1 DOSE: 2.5; .5 SOLUTION RESPIRATORY (INHALATION) at 08:19

## 2024-12-05 RX ADMIN — ACETAMINOPHEN 650 MG: 325 TABLET ORAL at 13:13

## 2024-12-05 RX ADMIN — COBICISTAT 150 MG: 150 TABLET, FILM COATED ORAL at 09:05

## 2024-12-05 RX ADMIN — EMTRICITABINE 200 MG: 200 CAPSULE ORAL at 09:05

## 2024-12-05 RX ADMIN — MILRINONE LACTATE 0.25 MCG/KG/MIN: 0.2 INJECTION, SOLUTION INTRAVENOUS at 01:15

## 2024-12-05 RX ADMIN — SACUBITRIL AND VALSARTAN 1 TABLET: 49; 51 TABLET, FILM COATED ORAL at 19:49

## 2024-12-05 ASSESSMENT — PAIN DESCRIPTION - DESCRIPTORS
DESCRIPTORS: ACHING
DESCRIPTORS: ACHING;DISCOMFORT
DESCRIPTORS: ACHING

## 2024-12-05 ASSESSMENT — PAIN DESCRIPTION - LOCATION
LOCATION: HEAD

## 2024-12-05 ASSESSMENT — PAIN DESCRIPTION - ORIENTATION
ORIENTATION: MID
ORIENTATION: ANTERIOR
ORIENTATION: MID

## 2024-12-05 ASSESSMENT — PAIN DESCRIPTION - FREQUENCY: FREQUENCY: CONTINUOUS

## 2024-12-05 ASSESSMENT — PAIN DESCRIPTION - PAIN TYPE: TYPE: ACUTE PAIN

## 2024-12-05 ASSESSMENT — PAIN SCALES - GENERAL
PAINLEVEL_OUTOF10: 3
PAINLEVEL_OUTOF10: 6
PAINLEVEL_OUTOF10: 3

## 2024-12-05 ASSESSMENT — PAIN - FUNCTIONAL ASSESSMENT: PAIN_FUNCTIONAL_ASSESSMENT: ACTIVITIES ARE NOT PREVENTED

## 2024-12-05 ASSESSMENT — PAIN DESCRIPTION - ONSET: ONSET: ON-GOING

## 2024-12-05 NOTE — CONSULTS
[facility]  Palliative Care   Consult Note    NAME:  Kiana Walls  MEDICAL RECORD NUMBER:  [id number]  AGE: 44 y.o.   GENDER: female  : 1980  TODAY'S DATE:  2024    Subjective     Reason for Consult:  goals of care  Visit Type: Initial Consult      Kiana Walls is a 44 y.o. female referred by:   [x] Physician     PAST MEDICAL HISTORY      Diagnosis Date    Asthma     Folliculitis     HIV (human immunodeficiency virus infection) ([facility])     HIV (human immunodeficiency virus infection) ([facility])        PAST SURGICAL HISTORY  Past Surgical History:   Procedure Laterality Date    LYMPH NODE BIOPSY         FAMILY HISTORY  History reviewed. No pertinent family history.    SOCIAL HISTORY  Social History     Tobacco Use    Smoking status: Former     Current packs/day: 0.50     Types: Cigarettes    Smokeless tobacco: Former   Substance Use Topics    Alcohol use: Yes     Comment: occ.    Drug use: Yes     Types: Marijuana (Weed)     Comment: Daily.       ALLERGIES  Allergies   Allergen Reactions    Bee Venom Anaphylaxis and Swelling    Raspberry Hives    Strawberry Anaphylaxis and Hives     Eyes, throat swell    Sulfa Antibiotics Hives and Rash    Ascorbate     Doxycycline Other (See Comments)    Grass Pollen(K-O-R-T-Swt Andrew) Hives    Strawberry Extract Other (See Comments)     Eyes and lips swell shut.       MEDICATIONS  No current facility-administered medications on file prior to encounter.     Current Outpatient Medications on File Prior to Encounter   Medication Sig Dispense Refill    tacrolimus (PROTOPIC) 0.1 % ointment Apply 1 g topically 2 times daily Apply topically 2 times daily.      triamcinolone (KENALOG) 0.1 % ointment Apply 1 g topically 2 times daily Apply topically 2 times daily.      clobetasol (TEMOVATE) 0.05 % ointment Apply 1 each topically 2 times daily Apply topically 2 times daily.      hydrocortisone 1 % ointment Apply 1 Units/oz topically 2 times daily as needed

## 2024-12-05 NOTE — FLOWSHEET NOTE
12/05/24 1300   [place]-[facility] Eduardo   PAP (Systolic/Diastolic) 21/9   PAP (Mean) 14 mmHg   CVP (Mean) 4 mmHg   CVP (mmHg) 4 mmHg   SVO2 (%) 65 %   CO (l/min) 4.8 l/min   CCO 4.8 L/Min   CI (l/min/m2) 2.9 l/min/m2   SVR (Using NBP Mean) 1366.67 dyne*sec/cm5   GENOVEVA 3    0.92     Nitroglycerin @ 100 mcg/min  Nipride @ 2 mcg/kg/min  Milrinone @ 0.25 mcg/kg/min    Discussed with Dr. Munguia.

## 2024-12-05 NOTE — FLOWSHEET NOTE
12/04/24 1908   [place]-[facility]ardo   PAP (Systolic/Diastolic) 13/6   PAP (Mean) 9 mmHg   CVP (Mean) 1 mmHg   SVO2 (%) 56.4 %   CO (l/min) 4 l/min   CCO 4 L/Min   CI (l/min/m2) 2.3 l/min/m2   SVR (Using NBP Mean) 1740 dyne*sec/cm5   GENOVEVA 7    0.78     Nitro 90 mcg/min  Nipride 1.25 mcg/kg/min  Milrinone 0.25 mcg/kg/min  Lasix 5 mL/hr    Mixed venous results not transmitting over, results down below  Blood draw from [facility] Port     PH 7.468  pCO2 40.8  pO2 27.8  cHCO3 29.5  cSO2 56.4    Results sent to Dr. Macedo     1928: Order to stop the lasix gtt

## 2024-12-05 NOTE — FLOWSHEET NOTE
12/05/24 1600   [place]-[facility]   PAP (Systolic/Diastolic) 18/9   PAP (Mean) 12 mmHg   CVP (Mean) 2 mmHg   CVP (mmHg) 2 mmHg   SVO2 (%) 68.7 %   CO (l/min) 5.6 l/min   CCO 5.6 L/Min   CI (l/min/m2) 3.4 l/min/m2   SVR (Using NBP Mean) 1185.71 dyne*sec/cm5   GENOVEVA 4.5    1.06     Nitroglycerin @ 100 mcg/min  Nipride @ 2 mcg/kg/min    Discussed with Dr. Munguia. Per MD, remove swan and venous sheath.    [Follow-Up Visit] : a follow-up visit for [Knee Pain] : knee pain

## 2024-12-05 NOTE — FLOWSHEET NOTE
12/05/24 0645   [place]-[facility]   PAP (Systolic/Diastolic) 15/5   PAP (Mean) 9 mmHg   CVP (Mean) 4 mmHg   SVO2 (%) 61.3 %   CO (l/min) 4.9 l/min   CCO 4.9 L/Min   CI (l/min/m2) 2.9 l/min/m2   SVR (Using NBP Mean) 1175.51 dyne*sec/cm5   GENOVEVA 2.5    0.83   SV (ml) 53 ml     Calculated using Forrest's formula.  Drips:  Milrinone 0.25 mcg/kg/min  Nipride 1.25mcg/kg/min  Nitro 80mcg/min

## 2024-12-05 NOTE — FLOWSHEET NOTE
12/05/24 0105   [place]-[facility]ardo   PAP (Systolic/Diastolic) 19/12   PAP (Mean) 17 mmHg   CVP (Mean) 5 mmHg   SVO2 (%) 60.1 %   CO (l/min) 4.6 l/min   CCO 4.6 L/Min   CI (l/min/m2) 2.7 l/min/m2   SVR (Using NBP Mean) 1304.35 dyne*sec/cm5   GENOVEVA 1.4    0.82   SV (ml) 48 ml     Calculated using Forrest's calculator.    Mixed-venous results not flowing over from [facility] at this time. Results below.   pH 7.43  pCO2 42.7  pO2 30.5  cHCO3 28.5  Base excess 4.2  cSO2 60.1%    Drips Running:  Milrinone 0.25mcg/kg/min  Nipride 1.25 mcg/kg/min  Nitro 80mcg/min

## 2024-12-06 LAB
ANION GAP SERPL CALCULATED.3IONS-SCNC: 8 MMOL/L (ref 3–16)
BUN SERPL-MCNC: 23 MG/DL (ref 7–20)
CALCIUM SERPL-MCNC: 8.8 MG/DL (ref 8.3–10.6)
CHLORIDE SERPL-SCNC: 108 MMOL/L (ref 99–110)
CO2 SERPL-SCNC: 23 MMOL/L (ref 21–32)
CREAT SERPL-MCNC: 0.9 MG/DL (ref 0.6–1.1)
GFR SERPLBLD CREATININE-BSD FMLA CKD-EPI: 81 ML/MIN/{1.73_M2}
GLUCOSE SERPL-MCNC: 139 MG/DL (ref 70–99)
MAGNESIUM SERPL-MCNC: 1.88 MG/DL (ref 1.8–2.4)
POTASSIUM SERPL-SCNC: 4.4 MMOL/L (ref 3.5–5.1)
SODIUM SERPL-SCNC: 139 MMOL/L (ref 136–145)

## 2024-12-06 PROCEDURE — 6370000000 HC RX 637 (ALT 250 FOR IP): Performed by: INTERNAL MEDICINE

## 2024-12-06 PROCEDURE — 2580000003 HC RX 258: Performed by: FAMILY MEDICINE

## 2024-12-06 PROCEDURE — 6370000000 HC RX 637 (ALT 250 FOR IP): Performed by: STUDENT IN AN ORGANIZED HEALTH CARE EDUCATION/TRAINING PROGRAM

## 2024-12-06 PROCEDURE — 2100000000 HC CCU R&B

## 2024-12-06 PROCEDURE — 36415 COLL VENOUS BLD VENIPUNCTURE: CPT

## 2024-12-06 PROCEDURE — 80048 BASIC METABOLIC PNL TOTAL CA: CPT

## 2024-12-06 PROCEDURE — 99232 SBSQ HOSP IP/OBS MODERATE 35: CPT | Performed by: INTERNAL MEDICINE

## 2024-12-06 PROCEDURE — 2500000003 HC RX 250 WO HCPCS: Performed by: INTERNAL MEDICINE

## 2024-12-06 PROCEDURE — 94761 N-INVAS EAR/PLS OXIMETRY MLT: CPT

## 2024-12-06 PROCEDURE — 6360000002 HC RX W HCPCS: Performed by: INTERNAL MEDICINE

## 2024-12-06 PROCEDURE — 6370000000 HC RX 637 (ALT 250 FOR IP): Performed by: FAMILY MEDICINE

## 2024-12-06 PROCEDURE — 6370000000 HC RX 637 (ALT 250 FOR IP): Performed by: NURSE PRACTITIONER

## 2024-12-06 PROCEDURE — 94640 AIRWAY INHALATION TREATMENT: CPT

## 2024-12-06 PROCEDURE — 6360000002 HC RX W HCPCS: Performed by: FAMILY MEDICINE

## 2024-12-06 PROCEDURE — 83735 ASSAY OF MAGNESIUM: CPT

## 2024-12-06 PROCEDURE — 2580000003 HC RX 258: Performed by: INTERNAL MEDICINE

## 2024-12-06 RX ORDER — HYDRALAZINE HYDROCHLORIDE 50 MG/1
100 TABLET, FILM COATED ORAL EVERY 12 HOURS SCHEDULED
Status: DISCONTINUED | OUTPATIENT
Start: 2024-12-07 | End: 2024-12-08

## 2024-12-06 RX ORDER — CARVEDILOL 25 MG/1
25 TABLET ORAL 2 TIMES DAILY WITH MEALS
Status: DISCONTINUED | OUTPATIENT
Start: 2024-12-07 | End: 2024-12-09 | Stop reason: HOSPADM

## 2024-12-06 RX ADMIN — DARUNAVIR 800 MG: 800 TABLET, FILM COATED ORAL at 09:10

## 2024-12-06 RX ADMIN — SODIUM CHLORIDE, PRESERVATIVE FREE 10 ML: 5 INJECTION INTRAVENOUS at 19:52

## 2024-12-06 RX ADMIN — SODIUM CHLORIDE 1 MCG/KG/MIN: 9 INJECTION, SOLUTION INTRAVENOUS at 19:59

## 2024-12-06 RX ADMIN — SODIUM CHLORIDE 2 MCG/KG/MIN: 9 INJECTION, SOLUTION INTRAVENOUS at 09:48

## 2024-12-06 RX ADMIN — SPIRONOLACTONE 25 MG: 25 TABLET ORAL at 09:09

## 2024-12-06 RX ADMIN — METHYLPREDNISOLONE SODIUM SUCCINATE 40 MG: 40 INJECTION INTRAMUSCULAR; INTRAVENOUS at 09:09

## 2024-12-06 RX ADMIN — CARVEDILOL 12.5 MG: 12.5 TABLET, FILM COATED ORAL at 17:40

## 2024-12-06 RX ADMIN — SACUBITRIL AND VALSARTAN 1 TABLET: 49; 51 TABLET, FILM COATED ORAL at 09:10

## 2024-12-06 RX ADMIN — IPRATROPIUM BROMIDE AND ALBUTEROL SULFATE 1 DOSE: 2.5; .5 SOLUTION RESPIRATORY (INHALATION) at 20:30

## 2024-12-06 RX ADMIN — NITROGLYCERIN 80 MCG/MIN: 20 INJECTION INTRAVENOUS at 21:21

## 2024-12-06 RX ADMIN — BUDESONIDE 500 MCG: 0.5 SUSPENSION RESPIRATORY (INHALATION) at 08:04

## 2024-12-06 RX ADMIN — IPRATROPIUM BROMIDE AND ALBUTEROL SULFATE 1 DOSE: 2.5; .5 SOLUTION RESPIRATORY (INHALATION) at 08:04

## 2024-12-06 RX ADMIN — EMTRICITABINE 200 MG: 200 CAPSULE ORAL at 09:09

## 2024-12-06 RX ADMIN — SODIUM CHLORIDE 2 MCG/KG/MIN: 9 INJECTION, SOLUTION INTRAVENOUS at 01:29

## 2024-12-06 RX ADMIN — TENOFOVIR ALAFENAMIDE 12.5 MG: 25 TABLET ORAL at 09:09

## 2024-12-06 RX ADMIN — SACUBITRIL AND VALSARTAN 1 TABLET: 49; 51 TABLET, FILM COATED ORAL at 19:52

## 2024-12-06 RX ADMIN — CARVEDILOL 12.5 MG: 12.5 TABLET, FILM COATED ORAL at 09:09

## 2024-12-06 RX ADMIN — FUROSEMIDE 40 MG: 40 TABLET ORAL at 09:09

## 2024-12-06 RX ADMIN — COBICISTAT 150 MG: 150 TABLET, FILM COATED ORAL at 09:09

## 2024-12-06 RX ADMIN — ATORVASTATIN CALCIUM 40 MG: 40 TABLET, FILM COATED ORAL at 19:52

## 2024-12-06 RX ADMIN — OXYBUTYNIN CHLORIDE 10 MG: 10 TABLET, EXTENDED RELEASE ORAL at 19:52

## 2024-12-06 RX ADMIN — SODIUM CHLORIDE, PRESERVATIVE FREE 10 ML: 5 INJECTION INTRAVENOUS at 09:11

## 2024-12-06 RX ADMIN — IPRATROPIUM BROMIDE AND ALBUTEROL SULFATE 1 DOSE: 2.5; .5 SOLUTION RESPIRATORY (INHALATION) at 16:22

## 2024-12-06 RX ADMIN — NITROGLYCERIN 100 MCG/MIN: 20 INJECTION INTRAVENOUS at 05:00

## 2024-12-06 RX ADMIN — NITROGLYCERIN 110 MCG/MIN: 20 INJECTION INTRAVENOUS at 12:33

## 2024-12-06 RX ADMIN — HYDRALAZINE HYDROCHLORIDE 50 MG: 50 TABLET ORAL at 09:09

## 2024-12-06 RX ADMIN — BUDESONIDE 500 MCG: 0.5 SUSPENSION RESPIRATORY (INHALATION) at 20:30

## 2024-12-06 RX ADMIN — IPRATROPIUM BROMIDE AND ALBUTEROL SULFATE 1 DOSE: 2.5; .5 SOLUTION RESPIRATORY (INHALATION) at 11:49

## 2024-12-06 RX ADMIN — HYDRALAZINE HYDROCHLORIDE 50 MG: 50 TABLET ORAL at 19:53

## 2024-12-06 ASSESSMENT — PAIN SCALES - GENERAL
PAINLEVEL_OUTOF10: 0

## 2024-12-06 NOTE — CONSULTS
[facility]  HEART FAILURE PROGRAM      NAME:  Kiana Walls  MEDICAL RECORD NUMBER:  [id number]  AGE: 44 y.o.   GENDER: female  : 1980  ADMIT DATE: 12/3/2024  TODAY'S DATE:  2024    Subjective:     VISIT TYPE: evaluation     ADMITTING PHYSICIAN:  Robert Melgoza MD    Code Status: Full Code    PAST MEDICAL HISTORY        Diagnosis Date    Asthma     Folliculitis     HIV (human immunodeficiency virus infection) ([facility])     HIV (human immunodeficiency virus infection) ([facility])        SOCIAL HISTORY    Social History     Tobacco Use    Smoking status: Former     Current packs/day: 0.50     Types: Cigarettes    Smokeless tobacco: Former   Substance Use Topics    Alcohol use: Yes     Comment: occ.    Drug use: Yes     Types: Marijuana (Weed)     Comment: Daily.         MEDICATIONS  Scheduled Meds:   sacubitril-valsartan  1 tablet Oral BID    carvedilol  3.125 mg Oral Once    hydrALAZINE  50 mg Oral 2 times per day    carvedilol  12.5 mg Oral BID WC    furosemide  40 mg Oral Daily    atorvastatin  40 mg Oral Nightly    spironolactone  25 mg Oral Daily    oxyBUTYnin  10 mg Oral Nightly    sodium chloride flush  5-40 mL IntraVENous 2 times per day    enoxaparin  40 mg SubCUTAneous Daily    ipratropium 0.5 mg-albuterol 2.5 mg  1 Dose Inhalation Q4H WA RT    darunavir  800 mg Oral Daily    And    cobicistat  150 mg Oral Daily    And    emtricitabine  200 mg Oral Daily    And    Tenofovir Alafenamide Fumarate  12.5 mg Oral Daily    methylPREDNISolone  40 mg IntraVENous Daily    budesonide  0.5 mg Nebulization BID RT         Objective:     ADMISSION DIAGNOSIS:   Shortness of breath [R06.02]  Pericardial effusion [I31.39]  Pleural effusion [J90]  Renal impairment [N28.9]  Elevated troponin [R79.89]  Acute diastolic CHF (congestive heart failure) ([facility]) [I50.31]  Anemia, unspecified type [D64.9]  Acute congestive heart failure, unspecified heart failure type (HCC) [I50.9]  Congestive heart failure,

## 2024-12-06 NOTE — CARE COORDINATION
Chart reviewed.  Per chart, pt to discharge possibly over the weekend.  Has set up a new PCP appt for her with her permission.  She has not concerns for obtaining her medications.    Met with patient who reports she is very tired.  She states she is ready to go home.  Informed her it may be possible over the weekend.  She states she was aware.  She is working on a ride home.  Informed her of new pcp arranged, she states she did get the email about it and it's okay.  No other needs.       ESTRELLITA Williamson     Case Management   [phone]    12/6/2024  3:11 PM

## 2024-12-07 LAB
ANION GAP SERPL CALCULATED.3IONS-SCNC: 9 MMOL/L (ref 3–16)
BUN SERPL-MCNC: 23 MG/DL (ref 7–20)
CALCIUM SERPL-MCNC: 8.9 MG/DL (ref 8.3–10.6)
CHLORIDE SERPL-SCNC: 108 MMOL/L (ref 99–110)
CO2 SERPL-SCNC: 24 MMOL/L (ref 21–32)
CREAT SERPL-MCNC: 0.8 MG/DL (ref 0.6–1.1)
DEPRECATED RDW RBC AUTO: 20.7 % (ref 12.4–15.4)
GFR SERPLBLD CREATININE-BSD FMLA CKD-EPI: >90 ML/MIN/{1.73_M2}
GLUCOSE SERPL-MCNC: 130 MG/DL (ref 70–99)
HCT VFR BLD AUTO: 29.4 % (ref 36–48)
HGB BLD-MCNC: 9.4 G/DL (ref 12–16)
MAGNESIUM SERPL-MCNC: 1.76 MG/DL (ref 1.8–2.4)
MCH RBC QN AUTO: 27.4 PG (ref 26–34)
MCHC RBC AUTO-ENTMCNC: 31.9 G/DL (ref 31–36)
MCV RBC AUTO: 85.8 FL (ref 80–100)
NT-PROBNP SERPL-MCNC: 1340 PG/ML (ref 0–124)
PLATELET # BLD AUTO: 213 K/UL (ref 135–450)
PMV BLD AUTO: 8.1 FL (ref 5–10.5)
POTASSIUM SERPL-SCNC: 4.1 MMOL/L (ref 3.5–5.1)
RBC # BLD AUTO: 3.43 M/UL (ref 4–5.2)
SODIUM SERPL-SCNC: 141 MMOL/L (ref 136–145)
WBC # BLD AUTO: 8.6 K/UL (ref 4–11)

## 2024-12-07 PROCEDURE — 6370000000 HC RX 637 (ALT 250 FOR IP): Performed by: INTERNAL MEDICINE

## 2024-12-07 PROCEDURE — 80048 BASIC METABOLIC PNL TOTAL CA: CPT

## 2024-12-07 PROCEDURE — 2100000000 HC CCU R&B

## 2024-12-07 PROCEDURE — 94760 N-INVAS EAR/PLS OXIMETRY 1: CPT

## 2024-12-07 PROCEDURE — 6360000002 HC RX W HCPCS: Performed by: FAMILY MEDICINE

## 2024-12-07 PROCEDURE — 2580000003 HC RX 258: Performed by: INTERNAL MEDICINE

## 2024-12-07 PROCEDURE — 85027 COMPLETE CBC AUTOMATED: CPT

## 2024-12-07 PROCEDURE — 6370000000 HC RX 637 (ALT 250 FOR IP): Performed by: FAMILY MEDICINE

## 2024-12-07 PROCEDURE — 6370000000 HC RX 637 (ALT 250 FOR IP): Performed by: NURSE PRACTITIONER

## 2024-12-07 PROCEDURE — 2580000003 HC RX 258

## 2024-12-07 PROCEDURE — 6360000002 HC RX W HCPCS: Performed by: INTERNAL MEDICINE

## 2024-12-07 PROCEDURE — 2500000003 HC RX 250 WO HCPCS: Performed by: INTERNAL MEDICINE

## 2024-12-07 PROCEDURE — 94640 AIRWAY INHALATION TREATMENT: CPT

## 2024-12-07 PROCEDURE — 99232 SBSQ HOSP IP/OBS MODERATE 35: CPT | Performed by: INTERNAL MEDICINE

## 2024-12-07 PROCEDURE — 2580000003 HC RX 258: Performed by: FAMILY MEDICINE

## 2024-12-07 PROCEDURE — 83735 ASSAY OF MAGNESIUM: CPT

## 2024-12-07 PROCEDURE — 6370000000 HC RX 637 (ALT 250 FOR IP): Performed by: STUDENT IN AN ORGANIZED HEALTH CARE EDUCATION/TRAINING PROGRAM

## 2024-12-07 PROCEDURE — 83880 ASSAY OF NATRIURETIC PEPTIDE: CPT

## 2024-12-07 PROCEDURE — 36415 COLL VENOUS BLD VENIPUNCTURE: CPT

## 2024-12-07 RX ORDER — WATER 10 ML/10ML
INJECTION INTRAMUSCULAR; INTRAVENOUS; SUBCUTANEOUS
Status: COMPLETED
Start: 2024-12-07 | End: 2024-12-07

## 2024-12-07 RX ADMIN — HYDRALAZINE HYDROCHLORIDE 100 MG: 50 TABLET ORAL at 08:59

## 2024-12-07 RX ADMIN — EMPAGLIFLOZIN 10 MG: 10 TABLET, FILM COATED ORAL at 16:47

## 2024-12-07 RX ADMIN — FUROSEMIDE 40 MG: 40 TABLET ORAL at 08:59

## 2024-12-07 RX ADMIN — DARUNAVIR 800 MG: 800 TABLET, FILM COATED ORAL at 09:03

## 2024-12-07 RX ADMIN — TENOFOVIR ALAFENAMIDE 12.5 MG: 25 TABLET ORAL at 09:03

## 2024-12-07 RX ADMIN — NITROGLYCERIN 50 MCG/MIN: 20 INJECTION INTRAVENOUS at 11:51

## 2024-12-07 RX ADMIN — DIPHENHYDRAMINE HYDROCHLORIDE 25 MG: 50 INJECTION INTRAMUSCULAR; INTRAVENOUS at 03:32

## 2024-12-07 RX ADMIN — ATORVASTATIN CALCIUM 40 MG: 40 TABLET, FILM COATED ORAL at 20:27

## 2024-12-07 RX ADMIN — COBICISTAT 150 MG: 150 TABLET, FILM COATED ORAL at 09:02

## 2024-12-07 RX ADMIN — CARVEDILOL 25 MG: 25 TABLET, FILM COATED ORAL at 08:59

## 2024-12-07 RX ADMIN — SODIUM CHLORIDE 1 MCG/KG/MIN: 9 INJECTION, SOLUTION INTRAVENOUS at 21:44

## 2024-12-07 RX ADMIN — IPRATROPIUM BROMIDE AND ALBUTEROL SULFATE 1 DOSE: 2.5; .5 SOLUTION RESPIRATORY (INHALATION) at 15:47

## 2024-12-07 RX ADMIN — SACUBITRIL AND VALSARTAN 1 TABLET: 49; 51 TABLET, FILM COATED ORAL at 20:27

## 2024-12-07 RX ADMIN — SODIUM CHLORIDE, PRESERVATIVE FREE 10 ML: 5 INJECTION INTRAVENOUS at 09:20

## 2024-12-07 RX ADMIN — SACUBITRIL AND VALSARTAN 1 TABLET: 49; 51 TABLET, FILM COATED ORAL at 08:59

## 2024-12-07 RX ADMIN — EMTRICITABINE 200 MG: 200 CAPSULE ORAL at 09:03

## 2024-12-07 RX ADMIN — HYDRALAZINE HYDROCHLORIDE 100 MG: 50 TABLET ORAL at 20:27

## 2024-12-07 RX ADMIN — WATER 1 ML: 1 INJECTION INTRAMUSCULAR; INTRAVENOUS; SUBCUTANEOUS at 09:20

## 2024-12-07 RX ADMIN — ENOXAPARIN SODIUM 40 MG: 100 INJECTION SUBCUTANEOUS at 08:58

## 2024-12-07 RX ADMIN — METHYLPREDNISOLONE SODIUM SUCCINATE 40 MG: 40 INJECTION INTRAMUSCULAR; INTRAVENOUS at 09:06

## 2024-12-07 RX ADMIN — CARVEDILOL 25 MG: 25 TABLET, FILM COATED ORAL at 16:47

## 2024-12-07 RX ADMIN — SODIUM CHLORIDE 1 MCG/KG/MIN: 9 INJECTION, SOLUTION INTRAVENOUS at 09:02

## 2024-12-07 RX ADMIN — SODIUM CHLORIDE, PRESERVATIVE FREE 10 ML: 5 INJECTION INTRAVENOUS at 20:27

## 2024-12-07 RX ADMIN — OXYBUTYNIN CHLORIDE 10 MG: 10 TABLET, EXTENDED RELEASE ORAL at 20:27

## 2024-12-07 RX ADMIN — SPIRONOLACTONE 25 MG: 25 TABLET ORAL at 08:59

## 2024-12-07 RX ADMIN — IPRATROPIUM BROMIDE AND ALBUTEROL SULFATE 1 DOSE: 2.5; .5 SOLUTION RESPIRATORY (INHALATION) at 11:56

## 2024-12-07 RX ADMIN — BUDESONIDE 500 MCG: 0.5 SUSPENSION RESPIRATORY (INHALATION) at 11:56

## 2024-12-07 ASSESSMENT — PAIN SCALES - GENERAL
PAINLEVEL_OUTOF10: 0

## 2024-12-08 LAB
ANION GAP SERPL CALCULATED.3IONS-SCNC: 14 MMOL/L (ref 3–16)
BUN SERPL-MCNC: 28 MG/DL (ref 7–20)
CALCIUM SERPL-MCNC: 9.3 MG/DL (ref 8.3–10.6)
CHLORIDE SERPL-SCNC: 108 MMOL/L (ref 99–110)
CO2 SERPL-SCNC: 23 MMOL/L (ref 21–32)
CREAT SERPL-MCNC: 1 MG/DL (ref 0.6–1.1)
DEPRECATED RDW RBC AUTO: 20.9 % (ref 12.4–15.4)
GFR SERPLBLD CREATININE-BSD FMLA CKD-EPI: 71 ML/MIN/{1.73_M2}
GLUCOSE SERPL-MCNC: 160 MG/DL (ref 70–99)
HCT VFR BLD AUTO: 32.2 % (ref 36–48)
HGB BLD-MCNC: 10.1 G/DL (ref 12–16)
MCH RBC QN AUTO: 27.6 PG (ref 26–34)
MCHC RBC AUTO-ENTMCNC: 31.4 G/DL (ref 31–36)
MCV RBC AUTO: 87.9 FL (ref 80–100)
PLATELET # BLD AUTO: 241 K/UL (ref 135–450)
PMV BLD AUTO: 8.5 FL (ref 5–10.5)
POTASSIUM SERPL-SCNC: 4.1 MMOL/L (ref 3.5–5.1)
RBC # BLD AUTO: 3.66 M/UL (ref 4–5.2)
SODIUM SERPL-SCNC: 145 MMOL/L (ref 136–145)
WBC # BLD AUTO: 8.5 K/UL (ref 4–11)

## 2024-12-08 PROCEDURE — 6370000000 HC RX 637 (ALT 250 FOR IP): Performed by: INTERNAL MEDICINE

## 2024-12-08 PROCEDURE — 6370000000 HC RX 637 (ALT 250 FOR IP): Performed by: NURSE PRACTITIONER

## 2024-12-08 PROCEDURE — 2580000003 HC RX 258

## 2024-12-08 PROCEDURE — 94640 AIRWAY INHALATION TREATMENT: CPT

## 2024-12-08 PROCEDURE — 6360000002 HC RX W HCPCS: Performed by: FAMILY MEDICINE

## 2024-12-08 PROCEDURE — 36415 COLL VENOUS BLD VENIPUNCTURE: CPT

## 2024-12-08 PROCEDURE — 85027 COMPLETE CBC AUTOMATED: CPT

## 2024-12-08 PROCEDURE — 6370000000 HC RX 637 (ALT 250 FOR IP): Performed by: FAMILY MEDICINE

## 2024-12-08 PROCEDURE — 94760 N-INVAS EAR/PLS OXIMETRY 1: CPT

## 2024-12-08 PROCEDURE — 6370000000 HC RX 637 (ALT 250 FOR IP): Performed by: STUDENT IN AN ORGANIZED HEALTH CARE EDUCATION/TRAINING PROGRAM

## 2024-12-08 PROCEDURE — 2100000000 HC CCU R&B

## 2024-12-08 PROCEDURE — 2580000003 HC RX 258: Performed by: FAMILY MEDICINE

## 2024-12-08 PROCEDURE — 80048 BASIC METABOLIC PNL TOTAL CA: CPT

## 2024-12-08 PROCEDURE — 99232 SBSQ HOSP IP/OBS MODERATE 35: CPT | Performed by: INTERNAL MEDICINE

## 2024-12-08 RX ORDER — HYDRALAZINE HYDROCHLORIDE 50 MG/1
100 TABLET, FILM COATED ORAL EVERY 8 HOURS SCHEDULED
Status: DISCONTINUED | OUTPATIENT
Start: 2024-12-08 | End: 2024-12-09 | Stop reason: HOSPADM

## 2024-12-08 RX ORDER — WATER 10 ML/10ML
INJECTION INTRAMUSCULAR; INTRAVENOUS; SUBCUTANEOUS
Status: COMPLETED
Start: 2024-12-08 | End: 2024-12-08

## 2024-12-08 RX ADMIN — DARUNAVIR 800 MG: 800 TABLET, FILM COATED ORAL at 09:22

## 2024-12-08 RX ADMIN — OXYBUTYNIN CHLORIDE 10 MG: 10 TABLET, EXTENDED RELEASE ORAL at 20:11

## 2024-12-08 RX ADMIN — HYDRALAZINE HYDROCHLORIDE 100 MG: 50 TABLET ORAL at 15:23

## 2024-12-08 RX ADMIN — SACUBITRIL AND VALSARTAN 1 TABLET: 49; 51 TABLET, FILM COATED ORAL at 20:11

## 2024-12-08 RX ADMIN — HYDRALAZINE HYDROCHLORIDE 100 MG: 50 TABLET ORAL at 20:11

## 2024-12-08 RX ADMIN — COBICISTAT 150 MG: 150 TABLET, FILM COATED ORAL at 09:22

## 2024-12-08 RX ADMIN — CARVEDILOL 25 MG: 25 TABLET, FILM COATED ORAL at 09:20

## 2024-12-08 RX ADMIN — TENOFOVIR ALAFENAMIDE 12.5 MG: 25 TABLET ORAL at 09:21

## 2024-12-08 RX ADMIN — SPIRONOLACTONE 25 MG: 25 TABLET ORAL at 09:20

## 2024-12-08 RX ADMIN — BUDESONIDE 500 MCG: 0.5 SUSPENSION RESPIRATORY (INHALATION) at 08:29

## 2024-12-08 RX ADMIN — SODIUM CHLORIDE, PRESERVATIVE FREE 10 ML: 5 INJECTION INTRAVENOUS at 20:11

## 2024-12-08 RX ADMIN — BUDESONIDE 500 MCG: 0.5 SUSPENSION RESPIRATORY (INHALATION) at 20:59

## 2024-12-08 RX ADMIN — IPRATROPIUM BROMIDE AND ALBUTEROL SULFATE 1 DOSE: 2.5; .5 SOLUTION RESPIRATORY (INHALATION) at 11:49

## 2024-12-08 RX ADMIN — HYDRALAZINE HYDROCHLORIDE 100 MG: 50 TABLET ORAL at 09:20

## 2024-12-08 RX ADMIN — SACUBITRIL AND VALSARTAN 1 TABLET: 49; 51 TABLET, FILM COATED ORAL at 09:21

## 2024-12-08 RX ADMIN — EMTRICITABINE 200 MG: 200 CAPSULE ORAL at 09:22

## 2024-12-08 RX ADMIN — EMPAGLIFLOZIN 10 MG: 10 TABLET, FILM COATED ORAL at 09:21

## 2024-12-08 RX ADMIN — IPRATROPIUM BROMIDE AND ALBUTEROL SULFATE 1 DOSE: 2.5; .5 SOLUTION RESPIRATORY (INHALATION) at 08:29

## 2024-12-08 RX ADMIN — METHYLPREDNISOLONE SODIUM SUCCINATE 40 MG: 40 INJECTION INTRAMUSCULAR; INTRAVENOUS at 09:25

## 2024-12-08 RX ADMIN — FUROSEMIDE 40 MG: 40 TABLET ORAL at 09:20

## 2024-12-08 RX ADMIN — SODIUM CHLORIDE, PRESERVATIVE FREE 10 ML: 5 INJECTION INTRAVENOUS at 09:26

## 2024-12-08 RX ADMIN — IPRATROPIUM BROMIDE AND ALBUTEROL SULFATE 1 DOSE: 2.5; .5 SOLUTION RESPIRATORY (INHALATION) at 20:58

## 2024-12-08 RX ADMIN — ATORVASTATIN CALCIUM 40 MG: 40 TABLET, FILM COATED ORAL at 20:11

## 2024-12-08 RX ADMIN — CARVEDILOL 25 MG: 25 TABLET, FILM COATED ORAL at 17:25

## 2024-12-08 RX ADMIN — WATER 1 ML: 1 INJECTION INTRAMUSCULAR; INTRAVENOUS; SUBCUTANEOUS at 09:25

## 2024-12-08 ASSESSMENT — PAIN SCALES - GENERAL
PAINLEVEL_OUTOF10: 0

## 2024-12-09 VITALS
BODY MASS INDEX: 21.97 KG/M2 | OXYGEN SATURATION: 99 % | RESPIRATION RATE: 16 BRPM | DIASTOLIC BLOOD PRESSURE: 94 MMHG | HEIGHT: 67 IN | WEIGHT: 139.99 LBS | HEART RATE: 69 BPM | TEMPERATURE: 97.5 F | SYSTOLIC BLOOD PRESSURE: 133 MMHG

## 2024-12-09 LAB
DEPRECATED RDW RBC AUTO: 21.2 % (ref 12.4–15.4)
HCT VFR BLD AUTO: 34.8 % (ref 36–48)
HGB BLD-MCNC: 11.1 G/DL (ref 12–16)
MCH RBC QN AUTO: 27.6 PG (ref 26–34)
MCHC RBC AUTO-ENTMCNC: 31.8 G/DL (ref 31–36)
MCV RBC AUTO: 86.7 FL (ref 80–100)
PLATELET # BLD AUTO: 272 K/UL (ref 135–450)
PMV BLD AUTO: 8.3 FL (ref 5–10.5)
RBC # BLD AUTO: 4.01 M/UL (ref 4–5.2)
WBC # BLD AUTO: 8.7 K/UL (ref 4–11)

## 2024-12-09 PROCEDURE — 6370000000 HC RX 637 (ALT 250 FOR IP): Performed by: STUDENT IN AN ORGANIZED HEALTH CARE EDUCATION/TRAINING PROGRAM

## 2024-12-09 PROCEDURE — 6370000000 HC RX 637 (ALT 250 FOR IP): Performed by: FAMILY MEDICINE

## 2024-12-09 PROCEDURE — 85027 COMPLETE CBC AUTOMATED: CPT

## 2024-12-09 PROCEDURE — 6360000002 HC RX W HCPCS: Performed by: FAMILY MEDICINE

## 2024-12-09 PROCEDURE — 6370000000 HC RX 637 (ALT 250 FOR IP): Performed by: INTERNAL MEDICINE

## 2024-12-09 PROCEDURE — 36415 COLL VENOUS BLD VENIPUNCTURE: CPT

## 2024-12-09 PROCEDURE — 94761 N-INVAS EAR/PLS OXIMETRY MLT: CPT

## 2024-12-09 PROCEDURE — 94640 AIRWAY INHALATION TREATMENT: CPT

## 2024-12-09 PROCEDURE — 2580000003 HC RX 258

## 2024-12-09 PROCEDURE — 2580000003 HC RX 258: Performed by: FAMILY MEDICINE

## 2024-12-09 RX ORDER — WATER 10 ML/10ML
INJECTION INTRAMUSCULAR; INTRAVENOUS; SUBCUTANEOUS
Status: COMPLETED
Start: 2024-12-09 | End: 2024-12-09

## 2024-12-09 RX ORDER — ATORVASTATIN CALCIUM 40 MG/1
40 TABLET, FILM COATED ORAL NIGHTLY
Qty: 30 TABLET | Refills: 3 | Status: SHIPPED | OUTPATIENT
Start: 2024-12-09

## 2024-12-09 RX ORDER — SPIRONOLACTONE 25 MG/1
25 TABLET ORAL DAILY
Qty: 30 TABLET | Refills: 3 | Status: SHIPPED | OUTPATIENT
Start: 2024-12-10

## 2024-12-09 RX ORDER — CARVEDILOL 25 MG/1
25 TABLET ORAL 2 TIMES DAILY WITH MEALS
Qty: 60 TABLET | Refills: 3 | Status: SHIPPED | OUTPATIENT
Start: 2024-12-09

## 2024-12-09 RX ORDER — HYDRALAZINE HYDROCHLORIDE 100 MG/1
100 TABLET, FILM COATED ORAL EVERY 8 HOURS SCHEDULED
Qty: 90 TABLET | Refills: 3 | Status: SHIPPED | OUTPATIENT
Start: 2024-12-09

## 2024-12-09 RX ADMIN — EMTRICITABINE 200 MG: 200 CAPSULE ORAL at 08:03

## 2024-12-09 RX ADMIN — SODIUM CHLORIDE, PRESERVATIVE FREE 10 ML: 5 INJECTION INTRAVENOUS at 08:03

## 2024-12-09 RX ADMIN — SPIRONOLACTONE 25 MG: 25 TABLET ORAL at 08:02

## 2024-12-09 RX ADMIN — IPRATROPIUM BROMIDE AND ALBUTEROL SULFATE 1 DOSE: 2.5; .5 SOLUTION RESPIRATORY (INHALATION) at 08:21

## 2024-12-09 RX ADMIN — BUDESONIDE 500 MCG: 0.5 SUSPENSION RESPIRATORY (INHALATION) at 08:21

## 2024-12-09 RX ADMIN — WATER 10 ML: 1 INJECTION INTRAMUSCULAR; INTRAVENOUS; SUBCUTANEOUS at 08:03

## 2024-12-09 RX ADMIN — TENOFOVIR ALAFENAMIDE 12.5 MG: 25 TABLET ORAL at 08:03

## 2024-12-09 RX ADMIN — CARVEDILOL 25 MG: 25 TABLET, FILM COATED ORAL at 08:02

## 2024-12-09 RX ADMIN — SACUBITRIL AND VALSARTAN 1 TABLET: 49; 51 TABLET, FILM COATED ORAL at 08:02

## 2024-12-09 RX ADMIN — DARUNAVIR 800 MG: 800 TABLET, FILM COATED ORAL at 08:02

## 2024-12-09 RX ADMIN — FUROSEMIDE 40 MG: 40 TABLET ORAL at 08:02

## 2024-12-09 RX ADMIN — METHYLPREDNISOLONE SODIUM SUCCINATE 40 MG: 40 INJECTION INTRAMUSCULAR; INTRAVENOUS at 08:03

## 2024-12-09 RX ADMIN — HYDRALAZINE HYDROCHLORIDE 100 MG: 50 TABLET ORAL at 05:06

## 2024-12-09 RX ADMIN — EMPAGLIFLOZIN 10 MG: 10 TABLET, FILM COATED ORAL at 08:03

## 2024-12-09 RX ADMIN — COBICISTAT 150 MG: 150 TABLET, FILM COATED ORAL at 08:02

## 2024-12-09 ASSESSMENT — PAIN SCALES - GENERAL: PAINLEVEL_OUTOF10: 0

## 2024-12-09 NOTE — PROGRESS NOTES
Cardiovascular Progress Note      Chief Complaint:   Chief Complaint   Patient presents with    Shortness of Breath     pt arrives to ED via EMS from home with c/o SOB and chest pain for the past 24 hours. Pt states that she has hx of asthma, and has been using inhaler x 2 days with no relief. Pt states that she was in the hospital on 11/21 and noted that she had fluid and blood clots in lungs. Pt states that she took a water pill, but finished the prescription. Pt denies cough, congestion, fever, chills. Pt denies cardiac hx. Pt AAO x 4. VSS.     Impression/Recommendations:    Ms. Kiana Walls is a 43 y/o female, presented with worsening shortness of breath:     Acute biventricular systolic heart failure LVEF 10-15%, NYHA IV on arrival   HIV/AIDS, follows with [facility] Infectious Disease  Hyperlipidemia  Tobacco  Hx. Medication nonadherence      Volume compensated.   Discharge tomorrow when meds to bed available.   She plans to follow up with [facility] and [facility] ID.    GDMT  for EF </=40%    Beta Blocker--GDMT beta blocker: Coreg  contraindication/beta blocker: none    ARNI - Entresto (preferred)  contraindication ARNI: none    SGLT2 Inhibitor-sglt2i: Jardiance  contraindication/SGLT2i: none    MRA: Aldactone  contraindicaiton/MRA: none    Hydralazine increased to 100 mg tid.    Address ICD for pt with  EF</= 35%      Has ICD? ICD: No (explain why): 90 days of GDMT not complete     Interval History:  No events overnight.   No oxygen requirements.   No chest pain, shortness of breath, orthopnea, edema.   's systolic   SR 70-80s   144--> 136 lbs  -4.8L     Nitroglycerin gtt 10 mcg/min  Nitroprusside 1 mcg/kg/min    NT ProBNP 7129--> 1340    12/3/24 ECG  Sinus tachycardia  Rightward axis  Prolonged QT  Voltage criteria for left ventricular hypertrophy    TTE 12/3/24    Left Ventricle: Severely reduced left ventricular systolic function with a visually estimated EF of 10 -15%. Left ventricle is mildly 
  Physician Progress Note      PATIENT:               MANUEL CLEMONS  [facility] #:                  [id number]  :                       1980  ADMIT DATE:       12/3/2024 9:05 AM  DISCH DATE:  RESPONDING  PROVIDER #:        Rashaad SEGURA MD          QUERY TEXT:    Internal Medicine,    Pt admitted with Acute diastolic CHF. The cause of the acute CHF is unclear.   If possible, please document in progress notes and discharge summary the   etiology of CHF, if able to be determined.    The medical record reflects the following:  Risk Factors: HIV  Clinical Indicators: Acute CHF documented, ECHO showed moderate to severe   mitral regurgitation, /EF 10  with worsening CHF documented  Treatment:  labs, imaging, Cardiology consult, EKG,  ECHO, diuresis, medical   management    Thank you  Options provided:  -- CHF due to Hypertensive Heart Disease  -- CHF not due to Hypertension but due to valvular heart disease  -- CHF due to HIV  -- CHF due to, please document cause  -- Other - I will add my own diagnosis  -- Disagree - Not applicable / Not valid  -- Disagree - Clinically unable to determine / Unknown  -- Refer to Clinical Documentation Reviewer    PROVIDER RESPONSE TEXT:    This patient has CHF not due to hypertensive heart disease, CHF is due to   valvular heart disease.    Query created by: Dana Perera on 2024 4:43 PM      Electronically signed by:  Rashaad SEGURA MD 2024 4:26 PM          
4 Eyes Skin Assessment     NAME:  Kiana Walls  YOB: 1980  MEDICAL RECORD NUMBER:  [id number]    The patient is being assessed for  Shift Handoff    I agree that at least one RN has performed a thorough Head to Toe Skin Assessment on the patient. ALL assessment sites listed below have been assessed.      Areas assessed by both nurses:    Head, Face, Ears, Shoulders, Back, Chest, Arms, Elbows, Hands, Sacrum. Buttock, Coccyx, Ischium, Legs. Feet and Heels, and Under Medical Devices         Does the Patient have a Wound? No noted wound(s)       Luis Prevention initiated by RN: No  Wound Care Orders initiated by RN: No    Pressure Injury (Stage 3,4, Unstageable, DTI, NWPT, and Complex wounds) if present, place Wound referral order by RN under : No    New Ostomies, if present place, Ostomy referral order under : No     Nurse 1 eSignature: Electronically signed by Anisa Mayberry RN on 12/3/24 at 8:02 PM EST    **SHARE this note so that the co-signing nurse can place an eSignature**    Nurse 2 eSignature: Electronically signed by Mildred Velasquez RN on 12/3/24 at 10:56 PM EST  
4 Eyes Skin Assessment     NAME:  Kiana Walls  YOB: 1980  MEDICAL RECORD NUMBER:  [id number]    The patient is being assessed for  Admission    I agree that at least one RN has performed a thorough Head to Toe Skin Assessment on the patient. ALL assessment sites listed below have been assessed.      Areas assessed by both nurses:    Head, Face, Ears, Shoulders, Back, Chest, Arms, Elbows, Hands, Sacrum. Buttock, Coccyx, Ischium, and Legs. Feet and Heels        Does the Patient have a Wound? No noted wound(s)       Luis Prevention initiated by RN: No  Wound Care Orders initiated by RN: No    Pressure Injury (Stage 3,4, Unstageable, DTI, NWPT, and Complex wounds) if present, place Wound referral order by RN under : No    New Ostomies, if present place, Ostomy referral order under : No     Nurse 1 eSignature: Electronically signed by Gladis Lira RN on 12/3/24 at 3:47 PM EST    **SHARE this note so that the co-signing nurse can place an eSignature**    Nurse 2 eSignature: {Esignature:[id number]}    
Asked to evaluate urgently for worsening hypoxemia. Stat ECHO c/w severe biventricular failure. LV EF ~ 10%, severe MR. Recommend PA catheter placement. Start nitro gtt/lasix gtt/ primacor gtt. Hemodynamics to be trended q6 hrs. Consideration for upgrade to mechanical support based on clinical response to above therapy.     Full consult to follow    Mendez Munguia MD [facility]   General, Interventional Cardiology, and Peripheral Vascular Disease   [facility]   (O): [phone]  (F): [phone]    
CLINICAL PHARMACY NOTE: MEDS TO BEDS    Total # of Prescriptions Filled: 7   The following medications were delivered to the patient:  Current Discharge Medication List        START taking these medications    Details   spironolactone (ALDACTONE) 25 MG tablet Take 1 tablet by mouth daily  Qty: 30 tablet, Refills: 3      sacubitril-valsartan (ENTRESTO) 49-51 MG per tablet Take 1 tablet by mouth 2 times daily  Qty: 60 tablet, Refills: 3      carvedilol (COREG) 25 MG tablet Take 1 tablet by mouth 2 times daily (with meals)  Qty: 60 tablet, Refills: 3      hydrALAZINE (APRESOLINE) 100 MG tablet Take 1 tablet by mouth every 8 hours  Qty: 90 tablet, Refills: 3      atorvastatin (LIPITOR) 40 MG tablet Take 1 tablet by mouth nightly  Qty: 30 tablet, Refills: 3      empagliflozin (JARDIANCE) 10 MG tablet Take 1 tablet by mouth daily  Qty: 30 tablet, Refills: 3           Albuterol hfs    Additional Documentation:    
Complaining of overall itchiness. Pt requesting benadryl. Message sent to Dr. Allen   
[facility]    Admit Date: 12/3/2024    PCP: No primary care provider on file.                  : 1980  MRN: [id number]    Subjective:   Interval History:   Patient seen and examined. Clinical notes reviewed.   Telemetry reviewed. No new complaint today.   No major events overnight.   Denies having chest pain, shortness of breath, dyspnea on exertion, Orthopnea, PND at the time of this visit.    Excellent Uop  Cardiac index appropriate  Continues to wean down on afterload agents    Review of System:  Pertinent positive and negatives are in the HPI and interval above, the rest are negative.     Data:   Scheduled Meds:   methylPREDNISolone sodium succ        carvedilol  3.125 mg Oral BID WC    atorvastatin  40 mg Oral Nightly    spironolactone  25 mg Oral Daily    [START ON 2024] sacubitril-valsartan  1 tablet Oral BID    sodium chloride flush  5-40 mL IntraVENous 2 times per day    enoxaparin  40 mg SubCUTAneous Daily    ipratropium 0.5 mg-albuterol 2.5 mg  1 Dose Inhalation Q4H WA RT    darunavir  800 mg Oral Daily    And    cobicistat  150 mg Oral Daily    And    emtricitabine  200 mg Oral Daily    And    Tenofovir Alafenamide Fumarate  12.5 mg Oral Daily    methylPREDNISolone  40 mg IntraVENous Daily    budesonide  0.5 mg Nebulization BID RT     PRN Meds:methylPREDNISolone sodium succ, diphenhydrAMINE, sodium chloride flush, sodium chloride, ondansetron **OR** ondansetron, polyethylene glycol, acetaminophen **OR** acetaminophen, potassium chloride **OR** potassium alternative oral replacement **OR** potassium chloride, magnesium sulfate, sulfur hexafluoride microspheres, ipratropium  I/O last 3 completed shifts:  In: 1078.4 [P.O.:600; I.V.:478.4]  Out: 2925 [Urine:2925]  I/O this shift:  In: 531.6 [I.V.:531.6]  Out: 1575 [Urine:1575]    Intake/Output Summary (Last 24 hours) at 2024 1525  Last data filed at 2024 1500  Gross per 24 hour   Intake 1610.06 ml   Output 4500 ml   Net 
Discharge instructions reviewed with patient. Patient and family verbalized understanding. All home medications have been reviewed, questions answered and patient voiced understanding. All medication side effects reviewed and patient and family verbalized understanding. Follow up appointments reviewed with patient and has cardiology and PCP appt. Patient given prescriptions from outpatient pharmacy, discharge instructions, and appointment times. Patient discharged to home via transport private car. Taken to lobby via wheelchair.                  
Dr. Munguia at bedside to place swan  2mg Versed and 50 mcg Fentanyl given during procedure.     [place] is at 55    Pt tolerated procedure well.  
EDSBAR report has been reviewed.      Electronically signed by Gladis Lira RN on 12/3/2024 at 12:46 PM      
Medication Reconciliation    List of medications patient is currently taking is complete.    Source(s) of information:   Conversation with patient at bedside  EPIC records   [facility] 11/25/2024    Notes regarding home medications:   Patient started on various topical ointments following Dermatology visit at [facility]. Added to list.  HIV meds are Symtuza and Pifeltro. Patient can provide home supply if continued during acute hospital admission.  Patient not taking dapsone but likely should be on this for PCP ppx 2/2 HIV.  Patient no taking ferrous sulfate, but it seems her [facility] doctor may want he on this.    [place], PharmD Candidate  12/3/2024 12:18 PM    
NAME:  Kiana Walls  YOB: 1980  MEDICAL RECORD NUMBER:  [id number]    Shift Summary: Pt continues on CVU. Nipride and Nitro gtts still infusing. Able to wean Nitro some overnight. Unable to make adjustments to nipride. Pt calm and cooperative with plan of care. No other acute events overnight.     Family updated: No    Rhythm: Normal Sinus Rhythm     Most recent vitals:   Visit Vitals  /73   Pulse 74   Temp 97.4 °F (36.3 °C) (Oral)   Resp 16   Ht 1.702 m (5' 7\")   Wt 62 kg (136 lb 11 oz)   SpO2 99%   BMI 21.41 kg/m²           No data found.    No data found.      Respiratory support needed (if any):  - RA    Admission weight Weight - Scale: 65.5 kg (144 lb 6.4 oz) (12/03/24 0912)    Today's weight    Wt Readings from Last 1 Encounters:   12/07/24 62 kg (136 lb 11 oz)        James need assessed each shift: N/A - no jaems present  UOP >30ml/hr: Adequate UO noted this shift.   Last documented BM (in last 48 hrs):  No data found.             Restraints (in use currently or dc'd in last 12 hrs): No    Order current and documentation up to date? NA    Lines/Drains reviewed @ bedside.  Peripheral IV 12/03/24 Right;Anterior Antecubital (Active)   Number of days: 3       Peripheral IV 12/05/24 Left;Proximal;Anterior Forearm (Active)   Number of days: 1         Drip rates at handoff:    nitroGLYCERIN 70 mcg/min (12/07/24 0608)    nitroPRUSSide (NIPRIDE) 50 mg in sodium chloride 0.9 % 250 mL infusion 1 mcg/kg/min (12/07/24 0608)    dexmedeTOMIDine      sodium chloride         Lab Data:   CBC:   Recent Labs     12/07/24  0334   WBC 8.6   HGB 9.4*   HCT 29.4*   MCV 85.8        BMP:    Recent Labs     12/06/24  0402 12/07/24  0334    141   K 4.4 4.1   CO2 23 24   BUN 23* 23*   CREATININE 0.9 0.8     LIVR: No results for input(s): \"AST\", \"ALT\" in the last 72 hours.  PT/INR: No results for input(s): \"INR\" in the last 72 hours.    Invalid input(s): \"PROT\"  APTT: No results for input(s): \"APTT\" in 
NAME:  Kiana Walls  YOB: 1980  MEDICAL RECORD NUMBER:  [id number]    Shift Summary: Milrinone gtt off. Nitro and nipride gtt running for MAP less than 100. Venous sheath and [place] removed. Pt refusing to take oral medications.     Family updated: No    Rhythm: Normal Sinus Rhythm     Most recent vitals:   Visit Vitals  /75   Pulse (!) 101   Temp 98.4 °F (36.9 °C) (Oral)   Resp 22   Ht 1.702 m (5' 7\")   Wt 58.9 kg (129 lb 13.6 oz)   SpO2 95%   BMI 20.34 kg/m²           Patient Vitals for the past 12 hrs:   CO (l/min) CI (l/min/m2) SVO2 (%)    12/05/24 1600 5.6 l/min 3.4 l/min/m2 68.7 % 1.06   12/05/24 1300 4.8 l/min 2.9 l/min/m2 65 % 0.92       Patient Vitals for the past 12 hrs:   GENOVEVA CVP (Mean)   12/05/24 1600 4.5 2 mmHg   12/05/24 1300 3 4 mmHg   12/05/24 1245 -- 0 mmHg   12/05/24 1230 -- 0 mmHg   12/05/24 1215 3.5 4 mmHg   12/05/24 1200 1.22 9 mmHg   12/05/24 1145 0.75 8 mmHg   12/05/24 1130 11 1 mmHg   12/05/24 1115 1.6 5 mmHg   12/05/24 1100 4 3 mmHg   12/05/24 1045 1.25 4 mmHg   12/05/24 1030 0.87 15 mmHg   12/05/24 1015 1.14 7 mmHg   12/05/24 1000 0.9 10 mmHg   12/05/24 0945 1.46 13 mmHg   12/05/24 0930 3.67 3 mmHg   12/05/24 0915 -- 0 mmHg   12/05/24 0900 2.5 4 mmHg   12/05/24 0845 5.5 2 mmHg   12/05/24 0830 5.5 2 mmHg   12/05/24 0815 -- 0 mmHg   12/05/24 0800 -- 0 mmHg   12/05/24 0745 15 1 mmHg   12/05/24 0730 -- 0 mmHg         Respiratory support needed (if any):  - RA    Admission weight Weight - Scale: 65.5 kg (144 lb 6.4 oz) (12/03/24 0912)    Today's weight    Wt Readings from Last 1 Encounters:   12/05/24 58.9 kg (129 lb 13.6 oz)        James need assessed each shift: YES -  - continue james r/t - adequate intake and output  UOP >30ml/hr: YES  Last documented BM (in last 48 hrs):  No data found.             Restraints (in use currently or dc'd in last 12 hrs): Yes    Order current and documentation up to date? Yes    Lines/Drains reviewed @ bedside.  Peripheral IV 12/03/24 
NAME:  Kiana Walls  YOB: 1980  MEDICAL RECORD NUMBER:  [id number]    Shift Summary: Geff placed at bedside, nitro started for MAP less than 80 which was not achieved overnight, nipride added and being used to titrate nitro off.     Family updated: Yes:  Sister    Rhythm: Sinus Tachycardia     Most recent vitals:   Visit Vitals  /82   Pulse 100   Temp 98.9 °F (37.2 °C) (Oral)   Resp 17   Ht 1.702 m (5' 7\")   Wt 61.2 kg (134 lb 14.7 oz)   SpO2 96%   BMI 21.13 kg/m²           Patient Vitals for the past 12 hrs:   CO (l/min) CI (l/min/m2) SVO2 (%)  SV (ml)   12/04/24 0555 2.9 l/min 1.7 l/min/m2 50 % 0.58 25 ml   12/04/24 0033 3.3 l/min 2 l/min/m2 52.7 % 0.71 34 ml   12/03/24 1940 3 l/min 1.8 l/min/m2 50 % 0.67 --       Patient Vitals for the past 12 hrs:   GENOVEVA CVP (Mean)   12/04/24 0600 1.57 7 mmHg   12/04/24 0555 1.38 8 mmHg   12/04/24 0554 2.17 6 mmHg   12/04/24 0553 1.5 6 mmHg   12/04/24 0545 0.08 236 mmHg   12/04/24 0530 2.57 7 mmHg   12/04/24 0518 2.71 7 mmHg   12/04/24 0446 2.33 6 mmHg   12/04/24 0445 3 4 mmHg   12/04/24 0430 2.17 6 mmHg   12/04/24 0415 2.67 6 mmHg   12/04/24 0400 1.62 13 mmHg   12/04/24 0345 0.76 17 mmHg   12/04/24 0330 1.36 11 mmHg   12/04/24 0315 4.5 4 mmHg   12/04/24 0300 3.2 5 mmHg   12/04/24 0245 4.5 4 mmHg   12/04/24 0230 3.2 5 mmHg   12/04/24 0215 3.6 5 mmHg   12/04/24 0200 2.29 7 mmHg   12/04/24 0146 2.25 8 mmHg   12/04/24 0145 2.38 8 mmHg   12/04/24 0130 2.13 8 mmHg   12/04/24 0115 2.1 10 mmHg   12/04/24 0100 1.33 12 mmHg   12/04/24 0045 1.46 13 mmHg   12/04/24 0033 1.69 13 mmHg   12/04/24 0030 1.53 15 mmHg   12/04/24 0015 1.93 14 mmHg   12/04/24 0000 0.85 20 mmHg   12/03/24 2345 1.1 10 mmHg   12/03/24 2330 1.4 10 mmHg   12/03/24 2315 1.22 9 mmHg   12/03/24 2300 1.56 9 mmHg   12/03/24 2245 1 11 mmHg   12/03/24 2230 1.3 10 mmHg   12/03/24 2215 1.56 9 mmHg   12/03/24 2200 1.33 12 mmHg   12/03/24 2145 2.86 7 mmHg   12/03/24 2130 2.75 8 mmHg   12/03/24 2115 3 8 
NAME:  Kiana Walls  YOB: 1980  MEDICAL RECORD NUMBER:  [id number]    Shift Summary: Nitro gtt down to 10 mcg; titrate for map less than 100. Patient ambulates well. No c/o SOB. Tolerates laying flat without issue. Anxious about discharge and social issues regarding rent late payments while unable to make payment in hospital. Otherwise uneventful shift. Patient asleep when nurse attempted to obtain standing weight; get weight when awake.      Family updated: No    Rhythm: Normal Sinus Rhythm     Most recent vitals:   Visit Vitals  /84   Pulse 77   Temp 98.1 °F (36.7 °C) (Oral)   Resp 16   Ht 1.702 m (5' 7\")   Wt 62 kg (136 lb 11 oz)   SpO2 99%   BMI 21.41 kg/m²           No data found.    No data found.      Respiratory support needed (if any):  - RA    Admission weight Weight - Scale: 65.5 kg (144 lb 6.4 oz) (12/03/24 0912)    Today's weight    Wt Readings from Last 1 Encounters:   12/07/24 62 kg (136 lb 11 oz)        James need assessed each shift: N/A - no james present  UOP >30ml/hr: YES  Last documented BM (in last 48 hrs):  No data found.             Restraints (in use currently or dc'd in last 12 hrs): No    Order current and documentation up to date? No    Lines/Drains reviewed @ bedside.  Peripheral IV 12/03/24 Right;Anterior Antecubital (Active)   Number of days: 4       Peripheral IV 12/05/24 Left;Proximal;Anterior Forearm (Active)   Number of days: 2         Drip rates at handoff:    nitroGLYCERIN 10 mcg/min (12/08/24 0143)    nitroPRUSSide (NIPRIDE) 50 mg in sodium chloride 0.9 % 250 mL infusion 1 mcg/kg/min (12/08/24 0143)    dexmedeTOMIDine      sodium chloride         Lab Data:   CBC:   Recent Labs     12/07/24  0334 12/08/24  0354   WBC 8.6 8.5   HGB 9.4* 10.1*   HCT 29.4* 32.2*   MCV 85.8 87.9    241     BMP:    Recent Labs     12/06/24  0402 12/07/24  0334    141   K 4.4 4.1   CO2 23 24   BUN 23* 23*   CREATININE 0.9 0.8     LIVR: No results for input(s): \"AST\", 
NAME:  Kiana Walls  YOB: 1980  MEDICAL RECORD NUMBER:  [id number]    Shift Summary: Days: Patient's third day here and still requiring 2 gtts to control elevated maps. Titrations are to map>100. Have been able to titrate both down today. Pt is up and mobile and has had no complaints. Tolerating eating and drinking. Heart failure and diet education given. Patient verbalized understanding. James removed with positive post void. Plan to adjust oral BP meds and titrate off gtts. Goal for 2-3 days.    Family updated: No    Rhythm: Normal Sinus Rhythm     Most recent vitals:   Visit Vitals  BP (!) 122/94   Pulse 99   Temp 98.3 °F (36.8 °C) (Oral)   Resp 22   Ht 1.702 m (5' 7\")   Wt 58.4 kg (128 lb 12 oz)   SpO2 100%   BMI 20.16 kg/m²           No data found.    No data found.      Respiratory support needed (if any):  - RA    Admission weight Weight - Scale: 65.5 kg (144 lb 6.4 oz) (12/03/24 0912)    Today's weight    Wt Readings from Last 1 Encounters:   12/06/24 58.4 kg (128 lb 12 oz)        James need assessed each shift: N/A - no james present  UOP >30ml/hr: YES  Last documented BM (in last 48 hrs):  No data found.             Restraints (in use currently or dc'd in last 12 hrs): No    Order current and documentation up to date? No    Lines/Drains reviewed @ bedside.  Peripheral IV 12/03/24 Right;Anterior Antecubital (Active)   Number of days: 3       Peripheral IV 12/05/24 Left;Proximal;Anterior Forearm (Active)   Number of days: 1         Drip rates at handoff:    nitroGLYCERIN 90 mcg/min (12/06/24 1747)    nitroPRUSSide (NIPRIDE) 50 mg in sodium chloride 0.9 % 250 mL infusion 1 mcg/kg/min (12/06/24 1408)    dexmedeTOMIDine      sodium chloride         Lab Data:   CBC:   Recent Labs     12/04/24  0019 12/04/24  0400   WBC  --  2.3*   HGB 11.8* 9.8*   HCT  --  29.8*   MCV  --  83.4   PLT  --  179     BMP:    Recent Labs     12/05/24  0657 12/06/24  0402    139   K 4.3 4.4   CO2 27 23   BUN 25* 
NAME:  Kiana Walls  YOB: 1980  MEDICAL RECORD NUMBER:  [id number]    Shift Summary: Titrating NTG and Nipride gtt per orders. Pt verbalized issues with rent and accruing late fees daily. Offered social service consult. Pt declined.     Family updated: No family at bedside.     Rhythm: Normal Sinus Rhythm     Most recent vitals:   Visit Vitals  /75   Pulse 91   Temp 98.5 °F (36.9 °C) (Oral)   Resp 16   Ht 1.702 m (5' 7\")   Wt 62 kg (136 lb 11 oz)   SpO2 100%   BMI 21.41 kg/m²           No data found.    No data found.      Respiratory support needed (if any):  - RA    Admission weight Weight - Scale: 65.5 kg (144 lb 6.4 oz) (12/03/24 0912)    Today's weight    Wt Readings from Last 1 Encounters:   12/07/24 62 kg (136 lb 11 oz)        James need assessed each shift: N/A - no james present  UOP >30ml/hr: YES  Last documented BM (in last 48 hrs):  No data found.             Restraints (in use currently or dc'd in last 12 hrs): No    Order current and documentation up to date? No    Lines/Drains reviewed @ bedside.  Peripheral IV 12/03/24 Right;Anterior Antecubital (Active)   Number of days: 4       Peripheral IV 12/05/24 Left;Proximal;Anterior Forearm (Active)   Number of days: 2         Drip rates at handoff:    nitroGLYCERIN 30 mcg/min (12/07/24 1858)    nitroPRUSSide (NIPRIDE) 50 mg in sodium chloride 0.9 % 250 mL infusion 1 mcg/kg/min (12/07/24 1858)    dexmedeTOMIDine      sodium chloride         Lab Data:   CBC:   Recent Labs     12/07/24  0334   WBC 8.6   HGB 9.4*   HCT 29.4*   MCV 85.8        BMP:    Recent Labs     12/06/24  0402 12/07/24  0334    141   K 4.4 4.1   CO2 23 24   BUN 23* 23*   CREATININE 0.9 0.8     LIVR: No results for input(s): \"AST\", \"ALT\" in the last 72 hours.  PT/INR: No results for input(s): \"INR\" in the last 72 hours.    Invalid input(s): \"PROT\"  APTT: No results for input(s): \"APTT\" in the last 72 hours.  ABG: No results for input(s): \"PHART\", \"[id number]\", 
NAME:  Kiana Walls  YOB: 1980  MEDICAL RECORD NUMBER:  [id number]    Shift Summary: Uneventful shift. Patient weaned off Nitro and Nipride gtts. See new PO medications added. Plan to discharge tomorrow. VSS throughout shift. Pt up ad tessie.     Family updated: Yes:  by family    Rhythm: Normal Sinus Rhythm     Most recent vitals:   Visit Vitals  /77   Pulse 82   Temp 97.6 °F (36.4 °C) (Oral)   Resp 19   Ht 1.702 m (5' 7\")   Wt 62.6 kg (138 lb 0.1 oz)   SpO2 100%   BMI 21.62 kg/m²           No data found.    No data found.      Respiratory support needed (if any):  - RA    Admission weight Weight - Scale: 65.5 kg (144 lb 6.4 oz) (12/03/24 0912)    Today's weight    Wt Readings from Last 1 Encounters:   12/08/24 62.6 kg (138 lb 0.1 oz)        James need assessed each shift: N/A - no james present  UOP >30ml/hr: YES  Last documented BM (in last 48 hrs):  No data found.             Restraints (in use currently or dc'd in last 12 hrs): No    Order current and documentation up to date? Yes    Lines/Drains reviewed @ bedside.  Peripheral IV 12/03/24 Right;Anterior Antecubital (Active)   Number of days: 5       Peripheral IV 12/05/24 Left;Proximal;Anterior Forearm (Active)   Number of days: 3         Drip rates at handoff:    nitroGLYCERIN Stopped (12/08/24 1137)    nitroPRUSSide (NIPRIDE) 50 mg in sodium chloride 0.9 % 250 mL infusion Stopped (12/08/24 1305)    dexmedeTOMIDine      sodium chloride         Lab Data:   CBC:   Recent Labs     12/07/24  0334 12/08/24  0354   WBC 8.6 8.5   HGB 9.4* 10.1*   HCT 29.4* 32.2*   MCV 85.8 87.9    241     BMP:    Recent Labs     12/07/24  0334 12/08/24  0354    145   K 4.1 4.1   CO2 24 23   BUN 23* 28*   CREATININE 0.8 1.0     LIVR: No results for input(s): \"AST\", \"ALT\" in the last 72 hours.  PT/INR: No results for input(s): \"INR\" in the last 72 hours.    Invalid input(s): \"PROT\"  APTT: No results for input(s): \"APTT\" in the last 72 hours.  ABG: No 
NAME:  Kiana Walls  YOB: 1980  MEDICAL RECORD NUMBER:  [id number]    Shift Summary: Uneventful shift. Remained off antihypertensive gtts. However, MAPs have been >100 d/t pt up ambulating many times throughout the night. Requesting food and drink frequently. Educated on importance of following diet and fluid restriction. Pt anxious to be discharged d/t financial concerns. Waiting on meds to bed from retail pharmacy today, then d/c or leave?    Family updated: Yes:  Pt updates herself    Rhythm: Normal Sinus Rhythm     Most recent vitals:   Visit Vitals  /82   Pulse 75   Temp 97.5 °F (36.4 °C) (Oral)   Resp 17   Ht 1.702 m (5' 7\")   Wt 63.5 kg (139 lb 15.9 oz)   SpO2 100%   BMI 21.93 kg/m²           No data found.    No data found.      Respiratory support needed (if any):  - RA    Admission weight Weight - Scale: 65.5 kg (144 lb 6.4 oz) (12/03/24 0912)    Today's weight    Wt Readings from Last 1 Encounters:   12/09/24 63.5 kg (139 lb 15.9 oz)        James need assessed each shift: N/A - no james present  UOP >30ml/hr: Yes    Last documented BM (in last 48 hrs):  No data found.             Restraints (in use currently or dc'd in last 12 hrs): No    Order current and documentation up to date? No    Lines/Drains reviewed @ bedside.  Peripheral IV 12/03/24 Right;Anterior Antecubital (Active)   Number of days: 5       Peripheral IV 12/05/24 Left;Proximal;Anterior Forearm (Active)   Number of days: 3         Drip rates at handoff:    nitroGLYCERIN Stopped (12/08/24 1137)    nitroPRUSSide (NIPRIDE) 50 mg in sodium chloride 0.9 % 250 mL infusion Stopped (12/08/24 1305)    dexmedeTOMIDine      sodium chloride         Lab Data:   CBC:   Recent Labs     12/08/24  0354 12/09/24  0435   WBC 8.5 8.7   HGB 10.1* 11.1*   HCT 32.2* 34.8*   MCV 87.9 86.7    272     BMP:    Recent Labs     12/07/24  0334 12/08/24  0354    145   K 4.1 4.1   CO2 24 23   BUN 23* 28*   CREATININE 0.8 1.0     LIVR: No 
Niro gtt maxed and not achieving goal, Dr. Munguia notified.   
Nitro and nipride not compatible. Will need new IV d/t compatibility.   
[facility]    Admit Date: 12/3/2024    PCP: No primary care provider on file.                  : 1980  MRN: [id number]    Subjective:   Interval History:   Patient seen and examined. Clinical notes reviewed.   Telemetry reviewed. No new complaint today.   No major events overnight.   Denies having chest pain, shortness of breath, dyspnea on exertion, Orthopnea, PND at the time of this visit.    Excellent Uop  Cardiac index appropriate  Doing well no new complaints     Review of System:  Pertinent positive and negatives are in the HPI and interval above, the rest are negative.     Data:   Scheduled Meds:   sacubitril-valsartan  1 tablet Oral BID    carvedilol  3.125 mg Oral Once    hydrALAZINE  50 mg Oral 2 times per day    [START ON 2024] carvedilol  12.5 mg Oral BID WC    [START ON 2024] furosemide  40 mg Oral Daily    atorvastatin  40 mg Oral Nightly    spironolactone  25 mg Oral Daily    oxyBUTYnin  10 mg Oral Nightly    sodium chloride flush  5-40 mL IntraVENous 2 times per day    enoxaparin  40 mg SubCUTAneous Daily    ipratropium 0.5 mg-albuterol 2.5 mg  1 Dose Inhalation Q4H WA RT    darunavir  800 mg Oral Daily    And    cobicistat  150 mg Oral Daily    And    emtricitabine  200 mg Oral Daily    And    Tenofovir Alafenamide Fumarate  12.5 mg Oral Daily    methylPREDNISolone  40 mg IntraVENous Daily    budesonide  0.5 mg Nebulization BID RT     PRN Meds:phenazopyridine, diphenhydrAMINE, sodium chloride flush, sodium chloride, ondansetron **OR** ondansetron, polyethylene glycol, acetaminophen **OR** acetaminophen, potassium chloride **OR** potassium alternative oral replacement **OR** potassium chloride, magnesium sulfate, sulfur hexafluoride microspheres, ipratropium  I/O last 3 completed shifts:  In: 2823.4 [P.O.:720; I.V.:2103.4]  Out: 3145 [Urine:3145]  I/O this shift:  In: 170.2 [I.V.:170.2]  Out: 160 [Urine:160]    Intake/Output Summary (Last 24 hours) at 2024  Last 
PA alarm started red alarming. Pressure were noted to be inaccurate with flat waveform. Attempted to re-level and zero. CVU supervisor Selin to beside. PA port with good blood return. [place] placement remains unchanged. Message sent updating Dr. Macedo. Mixed venous results appear falsely elevated. Dr. Macedo in agreement that it appears the swan might be too deep and agrees with getting a chest xray. Pending x-ray.  
Pt ambulating independently in room at this time. Often up walking at top of the hour when BP cuff set to go off. MAP higher than goal at this time d/t activity. Will reassess when pt resting.  
Pt arrived to room 5277. Pt placed on tele monitor #[id number] and verified with CMU. Pt orientated to room and call light. Electronically signed by Gladis Lira RN on 12/3/2024 at 2:41 PM    
Pt transferred from the floor to room 1310. Beside report rec'd from Gladis GOLDEN. Pt visible tachypenic and states her chest feels tight during exertion. Pt assisted to bed and attached to CVU monitors, initially sats mid 90's on 4 L. Oxygen titrated down to room air and she is sating 100%. RR 20's. Pt ST in the teens on the monitor. Pt voided 200 cc on arrival of clear yellow urine. Awaiting bedside ECHO.  
Pt very agitated and frustrated with staff titrating her BP meds according to MD orders and MAR. Pt states \"you should tell me when you go up on blood pressure medicine.\"     Electronically signed by Doretha Thibodeaux RN on 12/5/2024 at 10:15 AM    
Pt very agitated and restless. Pt very frustrated with care. This RN discussed with patient about concerns of her getting out of bed without assistance. This RN found patient on side of bed rail trying to get OOB. Pt states \"Dr. Munguia said I could get up and walk.\" This RN educated patient that possible risk of falling and safety concerns this RN needed to be in room for assistance. Pt states \"I want you out of my room.\" Discussed situation with available .  went into room to discuss certain situation of safety concerns.   
Pt with complaints of shortness of breath with exertion. 02 sat does appear to drop into the 70`s. Difficult to assess with a good pleth due to acrylic nails. 02 applied at 2L and 02 sat does increase to 94% on 2L. Pt voices she does get short of breath every time she moves. Dr Robert Melgoza notified. Respiratory updated. Electronically signed by Gladis Lira RN on 12/3/2024 at 4:55 PM    
Received call from Dr Mendez Munguia who asked that pt be transferred to CV ICU. Pt transported via wheelchair to room 1310. Report given. Electronically signed by Gladis Lira RN on 12/3/2024 at 6:41 PM    
This RN discussed with patient about MD adding more blood pressure medicine to assist with her high MAP. Patient states \"I want to wait and see if this medicine helps before I take any other medicine.\" Notified MD of concerns of patient.     Electronically signed by Doretha Thibodeaux RN on 12/5/2024 at 12:00 PM    
Updated Pt's sister Meg. Also updated sister's contact info in the chart   
[place] and venous sheath removed per order. Hemostasis achieved. No hematoma. Pt will need to lay flat for 30 minutes post venous sheath pull.  
Xray showed [place] was in deep. Pulled back to 50 cm by Selin GOLDEN  
2243  Last data filed at 12/6/2024 2230  Gross per 24 hour   Intake 1501.69 ml   Output 2375 ml   Net -873.31 ml           Objective:     Vitals: BP (!) 124/99   Pulse 88   Temp 97.5 °F (36.4 °C) (Oral)   Resp 18   Ht 1.702 m (5' 7\")   Wt 58.4 kg (128 lb 12 oz)   LMP 10/24/2024 (Approximate)   SpO2 100%   BMI 20.16 kg/m²     Physical Exam        LABS:    CBC:   Recent Labs     12/04/24  0019 12/04/24  0400   WBC  --  2.3*   HGB 11.8* 9.8*   HCT  --  29.8*   MCV  --  83.4   PLT  --  179                                                                BMP:    Recent Labs     12/04/24  0400 12/05/24  0657 12/06/24  0402   * 138 139   K 4.3 4.3 4.4    104 108   CO2 21 27 23   BUN 17 25* 23*   CREATININE 1.1 0.9 0.9   GLUCOSE 209* 143* 139*       LFT's:   Recent Labs     12/04/24  0400   AST 55*   ALT 19   BILITOT 0.3   ALKPHOS 65       Troponin: No results for input(s): \"TROPONINI\" in the last 72 hours.    BNP: No results for input(s): \"BNP\" in the last 72 hours.    Lipids:   Recent Labs     12/04/24  0400   CHOL 239*   HDL 45       INR: No results for input(s): \"INR\" in the last 72 hours.  -----------------------------------------------------------------  RAD:   XR CHEST PORTABLE   Final Result   [place] catheter tip in right lower lobe pulmonary artery      Right basilar opacity suggesting atelectasis or infection with an adjacent   small right pleural effusion         CT CHEST PULMONARY EMBOLISM W CONTRAST   Final Result   1. Worsening congestive heart failure.   2. Unchanged small pericardial effusion.           TTE 12/3/24    Left Ventricle: Severely reduced left ventricular systolic function with a visually estimated EF of 10 -15%. Left ventricle is mildly dilated. Mildly increased wall thickness. Severe global hypokinesis present.    Right Ventricle: Right ventricle is mildly dilated. Moderate to severely reduced systolic function. TAPSE is 1.2 cm.    Mitral Valve: Moderately thickened 
Oral Daily    And    emtricitabine  200 mg Oral Daily    And    Tenofovir Alafenamide Fumarate  12.5 mg Oral Daily    methylPREDNISolone  40 mg IntraVENous Daily    budesonide  0.5 mg Nebulization BID RT     Infusions:   nitroGLYCERIN Stopped (12/08/24 1137)    nitroPRUSSide (NIPRIDE) 50 mg in sodium chloride 0.9 % 250 mL infusion Stopped (12/08/24 1305)    dexmedeTOMIDine      sodium chloride       PRN Meds:  phenazopyridine, diphenhydrAMINE, sodium chloride flush, sodium chloride, ondansetron **OR** ondansetron, polyethylene glycol, acetaminophen **OR** acetaminophen, potassium chloride **OR** potassium alternative oral replacement **OR** potassium chloride, magnesium sulfate, sulfur hexafluoride microspheres, ipratropium    Physical    VITALS:  BP (!) 138/94   Pulse 94   Temp 97.5 °F (36.4 °C) (Oral)   Resp 19   Ht 1.702 m (5' 7\")   Wt 62.6 kg (138 lb 0.1 oz)   LMP 10/24/2024 (Approximate)   SpO2 100%   BMI 21.62 kg/m²   CONSTITUTIONAL:  ill but not toxic appearing 44 y.o. year-old female who is again laying in bed awake, alert, cooperative, no apparent distress, and appears stated age  EYES:  Lids and lashes normal, PERRL, EOMI, sclera clear, conjunctiva normal  ENT:  NC/AT, MMM    NECK:  Supple, symmetrical, trachea midline, no adenopathy  HEMATOLOGIC/LYMPHATICS:  no cervical, supraclavicular or axillary lymphadenopathy  LUNGS:  clear to auscultation bilaterally, No increased work of breathing, good air exchange, no crackles or wheezing  CARDIOVASCULAR:  Regular rate and rhythm, normal S1 and S2, no S3 or S4, and no significant murmurs, rubs or gallops noted. Normal apical impulse.   ABDOMEN:  Normal active bowel sounds, soft, non-tender, non-distended, no masses palpated, no organomegally  EXTREMITIES.  extremities atraumatic, no cyanosis or edema and Homans sign is negative, no sign of DVT.   MENTAL STATUS: Awake, alert, oriented to name, place and time.    NEUROLOGIC:  Cranial nerves II-XII are 
RA    Admission weight Weight - Scale: 65.5 kg (144 lb 6.4 oz) (12/03/24 0912)    Today's weight    Wt Readings from Last 1 Encounters:   12/04/24 61.2 kg (134 lb 14.7 oz)        James need assessed each shift: YES -  - continue james r/t - Strict I&O   UOP >30ml/hr: YES  Last documented BM (in last 48 hrs):  No data found.             Restraints (in use currently or dc'd in last 12 hrs): No    Order current and documentation up to date? N/A    Lines/Drains reviewed @ bedside.  Pulmonary Artery Cath  12/03/24 Left Subclavian (Active)   Number of days: 0       Peripheral IV 12/03/24 Right;Anterior Antecubital (Active)   Number of days: 1       Venous Sheath 12/03/24 Left (Active)   Number of days: 0       Urinary Catheter 12/03/24 (Active)   Number of days: 0         Drip rates at handoff:    nitroPRUSSide (NIPRIDE) 50 mg in sodium chloride 0.9 % 250 mL infusion 1.25 mcg/kg/min (12/04/24 1900)    dexmedeTOMIDine      milrinone 0.25 mcg/kg/min (12/04/24 1900)    sodium chloride      furosemide (LASIX) 100 mg in sodium chloride 0.9 % 100 mL infusion 5 mg/hr (12/04/24 1900)    nitroGLYCERIN 90 mcg/min (12/04/24 1900)       Lab Data:   CBC:   Recent Labs     12/03/24  0943 12/04/24  0019 12/04/24  0400   WBC 3.2*  --  2.3*   HGB 10.3* 11.8* 9.8*   HCT 31.7*  --  29.8*   MCV 85.5  --  83.4     --  179     BMP:    Recent Labs     12/03/24  0943 12/04/24  0400    134*   K 4.1 4.3   CO2 25 21   BUN 12 17   CREATININE 1.2* 1.1     LIVR:   Recent Labs     12/03/24  0943 12/04/24  0400   AST 68* 55*   ALT 22 19     PT/INR: No results for input(s): \"INR\" in the last 72 hours.    Invalid input(s): \"PROT\"  APTT: No results for input(s): \"APTT\" in the last 72 hours.  ABG: No results for input(s): \"PHART\", \"MYQ9RWQ\", \"PO2ART\" in the last 72 hours.    Any consults during the shift? No    Any signed and held orders to be released?  No        4 Eyes Skin Assessment       The patient is being assessed for  Shift 
Assessment on the patient. ALL assessment sites listed below have been assessed.      Areas assessed by both nurses: Head, Face, Ears, Shoulders, Back, Chest, Arms, Elbows, Hands, Sacrum. Buttock, Coccyx, Ischium, Legs. Feet and Heels, Under Medical Devices , and Other          Does the Patient have a Wound? No noted wound(s)    Wound Care Orders initiated by RN: No       Luis Prevention initiated by RN: Yes    Pressure Injury (Stage 3,4, Unstageable, DTI, NWPT, and Complex wounds) if present, place Wound referral order by RN under : No    New Ostomies, if present place, Ostomy referral order under : No     Nurse 1 eSignature: Electronically signed by Mary Hathaway RN on 12/5/24 at 8:33 AM EST    **SHARE this note so that the co-signing nurse can place an eSignature**    Nurse 2 eSignature: Electronically signed by Doretha Thibodeaux RN on 12/5/24 at 8:39 AM EST           
nerves II-XII are grossly intact.        Data    CBC with Differential:    Lab Results   Component Value Date/Time    WBC 2.3 12/04/2024 04:00 AM    HGB 9.8 12/04/2024 04:00 AM    HCT 29.8 12/04/2024 04:00 AM     12/04/2024 04:00 AM    MCV 83.4 12/04/2024 04:00 AM    RDW 20.3 12/04/2024 04:00 AM    LYMPHOPCT 23.7 12/04/2024 04:00 AM    MONOPCT 3.0 12/04/2024 04:00 AM    EOSPCT 0.0 12/04/2024 04:00 AM    BASOPCT 1.0 12/04/2024 04:00 AM    MONOSABS 0.1 12/04/2024 04:00 AM    LYMPHSABS 0.5 12/04/2024 04:00 AM    EOSABS 0.0 12/04/2024 04:00 AM    BASOSABS 0.0 12/04/2024 04:00 AM     BMP:    Lab Results   Component Value Date/Time     12/06/2024 04:02 AM    K 4.4 12/06/2024 04:02 AM    K 4.1 12/03/2024 09:43 AM     12/06/2024 04:02 AM    CO2 23 12/06/2024 04:02 AM    BUN 23 12/06/2024 04:02 AM    CREATININE 0.9 12/06/2024 04:02 AM    GLUCOSE 139 12/06/2024 04:02 AM    CALCIUM 8.8 12/06/2024 04:02 AM    GFRAA >60 01/14/2017 08:10 PM    LABGLOM 81 12/06/2024 04:02 AM     LFT:   Lab Results   Component Value Date/Time    ALKPHOS 65 12/04/2024 04:00 AM    ALT 19 12/04/2024 04:00 AM    AST 55 12/04/2024 04:00 AM    BILITOT 0.3 12/04/2024 04:00 AM    BILIDIR <0.1 12/04/2024 04:00 AM    IBILI 0.2 12/04/2024 04:00 AM     Magnesium:    Lab Results   Component Value Date/Time    MG 1.88 12/06/2024 04:02 AM     Phosphorus:  No results found for: \"PHOS\"  PT/INR:  No results found for: \"PTINR\"  U/A:    Lab Results   Component Value Date/Time    LEUKOCYTESUR SMALL 12/03/2024 10:00 AM    WBCUA 22 12/03/2024 10:00 AM    RBCUA 9 12/03/2024 10:00 AM    UROBILINOGEN 1.0 12/03/2024 10:00 AM    BILIRUBINUR Negative 12/03/2024 10:00 AM    BLOODU Negative 12/03/2024 10:00 AM    GLUCOSEU Negative 12/03/2024 10:00 AM    PROTEINU 100 12/03/2024 10:00 AM     ABG:  No results found for: \"PHART\", \"XWY2UYU\", \"I8JNIIJX\", \"DAJ1VKJ\", \"BEART\", \"THGBART\", \"PO2ART\", \"LFQ9OLJ\"        Intake/Output Summary (Last 24 hours) at 12/6/2024 
\"PO2ART\", \"KXK9GQL\"        Intake/Output Summary (Last 24 hours) at 12/5/2024 1527  Last data filed at 12/5/2024 0800  Gross per 24 hour   Intake 1105.5 ml   Output 1570 ml   Net -464.5 ml       Consults:  IP CONSULT TO HEART FAILURE NURSE/COORDINATOR  IP CONSULT TO CARDIOLOGY      Principal Problem:    Acute systolic (congestive) heart failure (HCC)  Resolved Problems:    * No resolved hospital problems. *      ASSESSMENT AND PLAN:    Acute systolic (congestive) heart failure (HCC)  - Echocardiogram 12/03/2024 with EF 10-15%    Left Ventricle: Severely reduced left ventricular systolic function with a visually estimated EF of 10 -15%. Left ventricle is mildly dilated. Mildly increased wall thickness. Severe global hypokinesis present.    Right Ventricle: Right ventricle is mildly dilated. Moderate to severely reduced systolic function. TAPSE is 1.2 cm.    Mitral Valve: Moderately thickened leaflets. Moderate to severe regurgitation.    Pericardium: Small (<1 cm) circumferential pericardial effusion present More localized moderate amount of pericardial effusion was visualized around the right atrium. No indication of cardiac tamponade.    Extracardiac: Bilateral pleural effusion.    Image quality is adequate.  Cardiogenic shock - per Cardiology  Biventricular systolic heart failure LVEF 10%  NYHA 4  - Cardiac output/index excellent today, mixed venous 69% most recent numbers  - Decrease Milrinone as able   - Start orals: Entresto LD BID, Aldactone 25mg Daily, Continue Coreg   - Lasix prn (currently off)  - Continue to wean nipride to a MAP goal 65-70  - Repeat PA catheter numbers Q6H    Continue Entresto, Spironolactone, Lasix, Coreg    HIV - continue home meds    Hyperlipidemia - No current evidence of Rhabdomyolysis or other adverse effects. Continue statin therapy while in the hospital         DVT Prophylaxis: Lovenox  Diet: ADULT DIET; Regular; No Added Salt (3-4 gm); 1500 ml  ADULT ORAL NUTRITION SUPPLEMENT; 
kg (132 lb 11.5 oz)   05/12/24 65.3 kg (144 lb)       GENERAL: Well developed, well nourished, no acute distress  NEUROLOGICAL: Alert and oriented x3  PSYCH: Normal mood and affect   SKIN: Warm and dry, without lesions  HEENT: Normocephalic, atraumatic, Sclera non-icteric, mucous membranes moist  NECK: supple, JVP normal, thyroid not enlarged   CAROTID: Normal upstroke, no bruits  CARDIAC: Normal PMI, regular rate and rhythm, normal S1S2, no murmur, rub  RESPIRATORY: Normal respiratory effort, clear to auscultation bilaterally  EXTREMITIES: No cyanosis, clubbing or edema, palpable pulses bilaterally   MUSCULOSKELETAL: No joint swelling or tenderness, no chest wall tenderness  GASTROINTESTINAL:  soft, non-tender, no bruit    Data Review:    CBC:   Recent Labs     12/07/24  0334   WBC 8.6   HGB 9.4*   HCT 29.4*   MCV 85.8        BMP:   Recent Labs     12/05/24  0657 12/06/24  0402 12/07/24  0334    139 141   K 4.3 4.4 4.1    108 108   CO2 27 23 24   BUN 25* 23* 23*   CREATININE 0.9 0.9 0.8   CALCIUM 8.5 8.8 8.9   MG 1.74* 1.88 1.76*     Marti Wolf DO, [facility]  Interventional Cardiology      [facility]  o: [phone] [id number], [address]  [place] [address]      NOTE:  This report was transcribed using voice recognition software.  Every effort was made to ensure accuracy; however, inadvertent computerized transcription errors may be present.  
(3-4 gm); 1500 ml  ADULT ORAL NUTRITION SUPPLEMENT; Breakfast, Lunch, Dinner; Standard High Calorie/High Protein Oral Supplement  Code Status: Full Code    PT/OT Eval Status: Not yet ordered (as patient would not be able to participate due to his current mental and/or medical condition)    Anticipate that Pt will discharge to: Pt's goal is home     Due to the high probability of clinically significant life threating deterioration of the patient's condition that required my urgent intervention, a total critical care time 50 minutes was used. This includes but not limited to examining patient, collaborating with other physicians, monitoring vital signs, telemetry, continuous pulse oximety, and clinical response to IV medications; documentation time, review and interpretation of laboratory and radiological data, review of nursing notes and old record review. This time excludes any time that may have been spent performing procedures for life threatening organ failure.           Dispo - Anticipated discharge date 3+ days    Rashaad Allen MD      
Status: Not yet ordered (as patient would not be able to participate due to his current mental and/or medical condition)    Anticipate that Pt will discharge to: Pt's goal is home         Dispo - Anticipated discharge date 2+ days still    Rashaad Allen MD

## 2024-12-09 NOTE — PLAN OF CARE
Problem: Chronic Conditions and Co-morbidities  Goal: Patient's chronic conditions and co-morbidity symptoms are monitored and maintained or improved  12/9/2024 0024 by Kandice Hunter RN  Outcome: Progressing  Flowsheets (Taken 12/8/2024 2011)  Care Plan - Patient's Chronic Conditions and Co-Morbidity Symptoms are Monitored and Maintained or Improved:   Monitor and assess patient's chronic conditions and comorbid symptoms for stability, deterioration, or improvement   Update acute care plan with appropriate goals if chronic or comorbid symptoms are exacerbated and prevent overall improvement and discharge  12/8/2024 1338 by Nathalie Forrest, RN  Outcome: Progressing  Flowsheets (Taken 12/8/2024 0900)  Care Plan - Patient's Chronic Conditions and Co-Morbidity Symptoms are Monitored and Maintained or Improved: Monitor and assess patient's chronic conditions and comorbid symptoms for stability, deterioration, or improvement     Problem: Discharge Planning  Goal: Discharge to home or other facility with appropriate resources  12/9/2024 0024 by Kandice Hunter RN  Outcome: Progressing  Flowsheets (Taken 12/8/2024 2011)  Discharge to home or other facility with appropriate resources:   Identify barriers to discharge with patient and caregiver   Refer to discharge planning if patient needs post-hospital services based on physician order or complex needs related to functional status, cognitive ability or social support system  12/8/2024 1338 by Nathalie Forrest, RN  Outcome: Progressing  Flowsheets (Taken 12/8/2024 0900)  Discharge to home or other facility with appropriate resources:   Identify barriers to discharge with patient and caregiver   Arrange for needed discharge resources and transportation as appropriate     Problem: Pain  Goal: Verbalizes/displays adequate comfort level or baseline comfort level  12/9/2024 0024 by Kandice Hunter RN  Outcome: Progressing  Flowsheets (Taken 12/8/2024 
  Problem: Chronic Conditions and Co-morbidities  Goal: Patient's chronic conditions and co-morbidity symptoms are monitored and maintained or improved  12/9/2024 0903 by Melani Pepe RN  Outcome: Completed  Flowsheets (Taken 12/9/2024 0842)  Care Plan - Patient's Chronic Conditions and Co-Morbidity Symptoms are Monitored and Maintained or Improved: Monitor and assess patient's chronic conditions and comorbid symptoms for stability, deterioration, or improvement  12/9/2024 0024 by Kandice Hunter RN  Outcome: Progressing  Flowsheets (Taken 12/8/2024 2011)  Care Plan - Patient's Chronic Conditions and Co-Morbidity Symptoms are Monitored and Maintained or Improved:   Monitor and assess patient's chronic conditions and comorbid symptoms for stability, deterioration, or improvement   Update acute care plan with appropriate goals if chronic or comorbid symptoms are exacerbated and prevent overall improvement and discharge     Problem: Discharge Planning  Goal: Discharge to home or other facility with appropriate resources  12/9/2024 0903 by Melani Pepe RN  Outcome: Completed  Flowsheets (Taken 12/9/2024 0842)  Discharge to home or other facility with appropriate resources:   Identify barriers to discharge with patient and caregiver   Arrange for needed discharge resources and transportation as appropriate  12/9/2024 0024 by Kandice Hunter RN  Outcome: Progressing  Flowsheets (Taken 12/8/2024 2011)  Discharge to home or other facility with appropriate resources:   Identify barriers to discharge with patient and caregiver   Refer to discharge planning if patient needs post-hospital services based on physician order or complex needs related to functional status, cognitive ability or social support system     Problem: Pain  Goal: Verbalizes/displays adequate comfort level or baseline comfort level  12/9/2024 0903 by Melani Pepe RN  Outcome: Completed  Flowsheets (Taken 12/9/2024 0807)  Verbalizes/displays adequate 
  Problem: Chronic Conditions and Co-morbidities  Goal: Patient's chronic conditions and co-morbidity symptoms are monitored and maintained or improved  Outcome: Progressing     Problem: Discharge Planning  Goal: Discharge to home or other facility with appropriate resources  Outcome: Progressing     Problem: Pain  Goal: Verbalizes/displays adequate comfort level or baseline comfort level  Outcome: Progressing     Problem: Safety - Adult  Goal: Free from fall injury  Outcome: Progressing     Problem: Respiratory - Adult  Goal: Achieves optimal ventilation and oxygenation  Outcome: Progressing     Problem: Cardiovascular - Adult  Goal: Maintains optimal cardiac output and hemodynamic stability  Outcome: Progressing  Goal: Absence of cardiac dysrhythmias or at baseline  Outcome: Progressing     Problem: Metabolic/Fluid and Electrolytes - Adult  Goal: Electrolytes maintained within normal limits  Outcome: Progressing  Goal: Hemodynamic stability and optimal renal function maintained  Outcome: Progressing     
  Problem: Chronic Conditions and Co-morbidities  Goal: Patient's chronic conditions and co-morbidity symptoms are monitored and maintained or improved  Outcome: Progressing  Flowsheets (Taken 12/3/2024 2000)  Care Plan - Patient's Chronic Conditions and Co-Morbidity Symptoms are Monitored and Maintained or Improved: Monitor and assess patient's chronic conditions and comorbid symptoms for stability, deterioration, or improvement     Problem: Discharge Planning  Goal: Discharge to home or other facility with appropriate resources  Outcome: Progressing  Flowsheets (Taken 12/3/2024 2000)  Discharge to home or other facility with appropriate resources: Identify barriers to discharge with patient and caregiver     Problem: Pain  Goal: Verbalizes/displays adequate comfort level or baseline comfort level  Outcome: Progressing     Problem: Safety - Adult  Goal: Free from fall injury  Outcome: Progressing     Problem: Respiratory - Adult  Goal: Achieves optimal ventilation and oxygenation  Outcome: Progressing  Flowsheets (Taken 12/3/2024 2000)  Achieves optimal ventilation and oxygenation: Assess for changes in respiratory status     Problem: Cardiovascular - Adult  Goal: Maintains optimal cardiac output and hemodynamic stability  Outcome: Progressing  Flowsheets (Taken 12/3/2024 2000)  Maintains optimal cardiac output and hemodynamic stability: Monitor blood pressure and heart rate  Goal: Absence of cardiac dysrhythmias or at baseline  Outcome: Progressing  Flowsheets (Taken 12/3/2024 2000)  Absence of cardiac dysrhythmias or at baseline: Monitor cardiac rate and rhythm     Problem: Metabolic/Fluid and Electrolytes - Adult  Goal: Electrolytes maintained within normal limits  Outcome: Progressing  Flowsheets (Taken 12/3/2024 2000)  Electrolytes maintained within normal limits: Monitor labs and assess patient for signs and symptoms of electrolyte imbalances  Goal: Hemodynamic stability and optimal renal function 
Problem: Discharge Planning  Goal: Discharge to home or other facility with appropriate resources  Outcome: Progressing  Flowsheets (Taken 12/7/2024 1635)  Discharge to home or other facility with appropriate resources:   Identify barriers to discharge with patient and caregiver   Identify discharge learning needs (meds, wound care, etc)   Arrange for needed discharge resources and transportation as appropriate   Arrange for interpreters to assist at discharge as needed   Refer to discharge planning if patient needs post-hospital services based on physician order or complex needs related to functional status, cognitive ability or social support system     Problem: Chronic Conditions and Co-morbidities  Goal: Patient's chronic conditions and co-morbidity symptoms are monitored and maintained or improved  Outcome: Progressing  Flowsheets (Taken 12/7/2024 1635)  Care Plan - Patient's Chronic Conditions and Co-Morbidity Symptoms are Monitored and Maintained or Improved:   Monitor and assess patient's chronic conditions and comorbid symptoms for stability, deterioration, or improvement   Collaborate with multidisciplinary team to address chronic and comorbid conditions and prevent exacerbation or deterioration   Update acute care plan with appropriate goals if chronic or comorbid symptoms are exacerbated and prevent overall improvement and discharge       Problem: Pain  Goal: Verbalizes/displays adequate comfort level or baseline comfort level  Outcome: Progressing  Flowsheets (Taken 12/7/2024 1635)  Verbalizes/displays adequate comfort level or baseline comfort level:   Encourage patient to monitor pain and request assistance   Administer analgesics based on type and severity of pain and evaluate response   Consider cultural and social influences on pain and pain management   Assess pain using appropriate pain scale   Implement non-pharmacological measures as appropriate and evaluate response      Problem: Safety - 
and Electrolytes - Adult  Goal: Electrolytes maintained within normal limits  Outcome: Progressing  Flowsheets (Taken 12/8/2024 0900)  Electrolytes maintained within normal limits:   Administer electrolyte replacement as ordered   Monitor labs and assess patient for signs and symptoms of electrolyte imbalances   Monitor response to electrolyte replacements, including repeat lab results as appropriate  Goal: Hemodynamic stability and optimal renal function maintained  Outcome: Progressing  Flowsheets (Taken 12/8/2024 0900)  Hemodynamic stability and optimal renal function maintained:   Monitor labs and assess for signs and symptoms of volume excess or deficit   Monitor intake, output and patient weight   Monitor urine specific gravity, serum osmolarity and serum sodium as indicated or ordered     
available)  12/5/2024 1905 by Doretha Thibodeaux, RN  Outcome: Progressing

## 2024-12-09 NOTE — CARE COORDINATION
DC order noted.  Spoke with bedside RN, Melani, who reports they are waiting on [employer] to deliver her meds.  Pt states she can get a ride from her insurance.  ESTRELLITA Williamson     Case Management   [phone]    12/9/2024  8:48 AM

## 2024-12-09 NOTE — NURSE NAVIGATOR
Discharge order noted. There is a follow up appointment scheduled   Bernadette Gale CNP 12/13/24 @1100am with an appointment with [facility]   [facility] @ 1200  All GDMT has been addressed/ordered  Dc wt 139lbs standing

## 2024-12-12 ENCOUNTER — FOLLOWUP TELEPHONE ENCOUNTER (OUTPATIENT)
Dept: ADMINISTRATIVE | Age: 44
End: 2024-12-12

## 2024-12-12 NOTE — PROGRESS NOTES
72 hour call. Attempted x3 to reach patient. [phone]  Voicemail left with call back number, and appointment reminders.

## 2024-12-13 ENCOUNTER — TELEPHONE (OUTPATIENT)
Dept: PHARMACY | Age: 44
End: 2024-12-13

## 2024-12-13 NOTE — TELEPHONE ENCOUNTER
[facility] appt reminder call on 12/12, s/w pt to confirm appt w/ wellness center and let her know she had an appt with Bernadette Gale @11:00. Pt confirmed both appts, I scheduled [employer] ride to drop off at Medical office bld at 11, after that she would walk over to [facility] for appt w/ [facility]. Asked pt to call me from [facility] if she needed me to meet her over there with a wheelchair. 12/13 [employer] ride was cancelled by pt, I tried calling her to make sure she didn't cancel by accident,had to lvm on both phone numbers provided. Will try later on today before her scheduled appt.

## 2024-12-16 ENCOUNTER — FOLLOWUP TELEPHONE ENCOUNTER (OUTPATIENT)
Dept: ADMINISTRATIVE | Age: 44
End: 2024-12-16

## 2024-12-16 NOTE — PROGRESS NOTES
Follow up call 7 days since discharge discharge, attemtped to reach pt on 2 different phone numbers, [phone] ( given to me by pt), [phone] listed as home number. Voicemail left on each  number with call back numbers

## 2024-12-24 ENCOUNTER — OFFICE VISIT (OUTPATIENT)
Age: 44
End: 2024-12-24

## 2024-12-24 VITALS
DIASTOLIC BLOOD PRESSURE: 88 MMHG | HEART RATE: 102 BPM | WEIGHT: 138 LBS | BODY MASS INDEX: 21.61 KG/M2 | TEMPERATURE: 97.6 F | SYSTOLIC BLOOD PRESSURE: 124 MMHG | OXYGEN SATURATION: 99 %

## 2024-12-24 DIAGNOSIS — R06.02 SHORTNESS OF BREATH: Primary | ICD-10-CM

## 2024-12-24 NOTE — PROGRESS NOTES
Patient came out of exam room and chose to leave from here prior to being seen by myself. She states that she will not be able to make it to the pharmacy on time. I did inform her that she is the next patient to be seen and I would see her shortly. Patient states understanding, however, states that she will go at this time. I did not have a chance to discuss further with the patient.

## 2024-12-30 ENCOUNTER — FOLLOWUP TELEPHONE ENCOUNTER (OUTPATIENT)
Dept: ADMINISTRATIVE | Age: 44
End: 2024-12-30

## 2024-12-30 NOTE — PROGRESS NOTES
Follow up call / 21 days since discharge.   Voicemail left with reminders to keep appointments scheduled next week, and to call if any questions/problems

## 2025-01-16 ENCOUNTER — PHARMACY VISIT (OUTPATIENT)
Dept: PHARMACY | Age: 45
End: 2025-01-16
Payer: MEDICAID

## 2025-01-16 VITALS
SYSTOLIC BLOOD PRESSURE: 139 MMHG | OXYGEN SATURATION: 100 % | DIASTOLIC BLOOD PRESSURE: 96 MMHG | BODY MASS INDEX: 22.55 KG/M2 | HEART RATE: 98 BPM | WEIGHT: 144 LBS

## 2025-01-16 DIAGNOSIS — I50.22 CHRONIC SYSTOLIC HEART FAILURE (HCC): Primary | ICD-10-CM

## 2025-01-16 PROCEDURE — 99214 OFFICE O/P EST MOD 30 MIN: CPT

## 2025-01-16 NOTE — PATIENT INSTRUCTIONS
- check to see if you have furosemide (Lasix).  Talk to Dr. Munguia tomorrow as to whether or not you should be taking this.     - next time you come here, bring your medication bottles with you.     - continue all medications as prescribed.  - avoid NSAIDs like ibuprofen, Advil, Aleve, naproxen.   - continue daily weights and call [facility] [phone] or  [phone] if you have weight gain of 3 pounds in a day or 5 pounds in a week.   - limit sodium to no more than 2,000mg per day.   - limit fluid to no more than 2,000mL (64 ounces) per day   - call [facility] [phone] right away with any signs or symptoms of heart failure like increased shortness of breath or swelling  - get regular activity with a goal of 30 minutes 5 times a week, follow a heart healthy eating pattern like the Mediterranean way of eating, remain up to date with vaccinations, avoid or limit alcohol and do not smoke.

## 2025-01-16 NOTE — PROGRESS NOTES
via Note to Provider and Patient/Caregiver: 4 via In person  Intervention Detail: Adherence Monitorin  Intervention Accepted By: Provider: 1 and Patient/Caregiver: 4  Gap Closed?: Yes   Time Spent (min): 60

## 2025-01-30 ENCOUNTER — APPOINTMENT (OUTPATIENT)
Dept: PHARMACY | Age: 45
End: 2025-01-30
Payer: MEDICAID

## 2025-02-10 ENCOUNTER — OFFICE VISIT (OUTPATIENT)
Age: 45
End: 2025-02-10

## 2025-02-10 VITALS
TEMPERATURE: 97.4 F | HEART RATE: 94 BPM | DIASTOLIC BLOOD PRESSURE: 92 MMHG | SYSTOLIC BLOOD PRESSURE: 154 MMHG | BODY MASS INDEX: 22.08 KG/M2 | OXYGEN SATURATION: 98 % | WEIGHT: 141 LBS

## 2025-02-10 DIAGNOSIS — Z76.0 MEDICATION REFILL: Primary | ICD-10-CM

## 2025-02-10 DIAGNOSIS — R03.0 ELEVATED BLOOD PRESSURE READING: ICD-10-CM

## 2025-02-10 RX ORDER — ALBUTEROL SULFATE 90 UG/1
2 INHALANT RESPIRATORY (INHALATION) 4 TIMES DAILY PRN
Qty: 18 G | Refills: 0 | Status: SHIPPED | OUTPATIENT
Start: 2025-02-10

## 2025-02-10 NOTE — PROGRESS NOTES
Kiana Walls (:  1980) is a 44 y.o. female,Established patient, here for evaluation of the following chief complaint(s):  Medication Refill (Albuterol inhaler refill )      ASSESSMENT/PLAN:    ICD-10-CM    1. Medication refill  Z76.0 albuterol sulfate HFA (VENTOLIN HFA) 108 (90 Base) MCG/ACT inhaler      2. Elevated blood pressure reading  R03.0 Amb Referral to Primary Care          Dx Disposition: medication refill  Education and handout provided on diagnosis and management of symptoms.   AVS reviewed with patient. Follow up as needed in UC or with PCP for new or worsening symptoms.   Return if symptoms worsen or fail to improve.  Explained she lost her inhaler over the weekend and [place] explained would fill this time but needs to get further refills from her PMD and to follow for her blood pressure is high    SUBJECTIVE/OBJECTIVE:  HPI    Vitals:    02/10/25 1611 02/10/25 1615 02/10/25 1625   BP: (!) 142/101 (!) 151/97 (!) 154/92   Site: Left Upper Arm Right Upper Arm    Position: Sitting Sitting    Cuff Size: Small Adult Small Adult    Pulse: 94     Temp: 97.4 °F (36.3 °C)     TempSrc: Oral     SpO2: 98%     Weight: 64 kg (141 lb)         Review of Systems    Physical Exam      An electronic signature was used to authenticate this note.    --Ang James, YANELY - CNP

## 2025-02-10 NOTE — PATIENT INSTRUCTIONS
Thank you for allowing us to care for you today and we hope you feel better soon  New Prescriptions    ALBUTEROL SULFATE HFA (VENTOLIN HFA) 108 (90 BASE) MCG/ACT INHALER    Inhale 2 puffs into the lungs 4 times daily as needed for Wheezing

## 2025-05-07 ENCOUNTER — OFFICE VISIT (OUTPATIENT)
Age: 45
End: 2025-05-07

## 2025-05-07 VITALS
SYSTOLIC BLOOD PRESSURE: 148 MMHG | TEMPERATURE: 98.1 F | DIASTOLIC BLOOD PRESSURE: 88 MMHG | OXYGEN SATURATION: 99 % | RESPIRATION RATE: 16 BRPM | HEART RATE: 101 BPM

## 2025-05-07 DIAGNOSIS — R03.0 ELEVATED BLOOD PRESSURE READING: ICD-10-CM

## 2025-05-07 DIAGNOSIS — L30.9 DERMATITIS: Primary | ICD-10-CM

## 2025-05-07 RX ORDER — PREDNISONE 20 MG/1
20 TABLET ORAL DAILY
Qty: 5 TABLET | Refills: 0 | Status: SHIPPED | OUTPATIENT
Start: 2025-05-07 | End: 2025-05-12

## 2025-05-07 RX ORDER — CEPHALEXIN 500 MG/1
500 CAPSULE ORAL 2 TIMES DAILY
Qty: 14 CAPSULE | Refills: 0 | Status: SHIPPED | OUTPATIENT
Start: 2025-05-07 | End: 2025-05-14

## 2025-05-07 RX ORDER — FAMOTIDINE 20 MG/1
20 TABLET, FILM COATED ORAL 2 TIMES DAILY
Qty: 60 TABLET | Refills: 3 | Status: SHIPPED | OUTPATIENT
Start: 2025-05-07

## 2025-05-07 ASSESSMENT — ENCOUNTER SYMPTOMS: ALLERGIC REACTION: 1

## 2025-05-07 NOTE — PROGRESS NOTES
Kiana Walls (:  1980) is a 45 y.o. female,Established patient, here for evaluation of the following chief complaint(s):  Allergic Reaction (Pt states she is having an allergic reaction to her medications, she received when she was in the hospital for heart failure in December)      ASSESSMENT/PLAN:    ICD-10-CM    1. Dermatitis  L30.9 cephALEXin (KEFLEX) 500 MG capsule     predniSONE (DELTASONE) 20 MG tablet     famotidine (PEPCID) 20 MG tablet      2. Elevated blood pressure reading  R03.0 Amb Referral to Primary Care          Dx Disposition:dermatitis   Education and handout provided on diagnosis and management of symptoms.   AVS reviewed with patient. Follow up as needed in UC or with PCP for new or worsening symptoms.   Return if symptoms worsen or fail to improve.      SUBJECTIVE/OBJECTIVE:  Patient presents today with complaints of rash since December that started after being in the hospital      History provided by:  Patient   used: No    Allergic Reaction        Vitals:    25 1050 25 1104   BP: (!) 151/101 (!) 148/88   Pulse: (!) 101    Resp: 16    Temp: 98.1 °F (36.7 °C)    SpO2: 99%        Review of Systems    Physical Exam  Constitutional:       Appearance: Normal appearance.   HENT:      Head: Normocephalic.      Nose: Nose normal.      Mouth/Throat:      Mouth: Mucous membranes are moist.      Pharynx: Oropharynx is clear.   Cardiovascular:      Rate and Rhythm: Regular rhythm. Tachycardia present.      Heart sounds: Normal heart sounds.   Pulmonary:      Effort: Pulmonary effort is normal.      Breath sounds: Normal breath sounds.   Musculoskeletal:         General: Normal range of motion.   Skin:     General: Skin is warm and dry.      Findings: Rash (generalized complaints of itching) present.   Neurological:      General: No focal deficit present.      Mental Status: She is alert and oriented to person, place, and time.   Psychiatric:         Mood and Affect:

## 2025-05-07 NOTE — PATIENT INSTRUCTIONS
Thank you for allowing us to care for you today and we hope you feel better soon  See PMD regarding change in medication

## 2025-05-21 ENCOUNTER — APPOINTMENT (OUTPATIENT)
Dept: GENERAL RADIOLOGY | Age: 45
DRG: 194 | End: 2025-05-21
Payer: MEDICAID

## 2025-05-21 ENCOUNTER — APPOINTMENT (OUTPATIENT)
Dept: CT IMAGING | Age: 45
DRG: 194 | End: 2025-05-21
Payer: MEDICAID

## 2025-05-21 ENCOUNTER — HOSPITAL ENCOUNTER (INPATIENT)
Age: 45
LOS: 1 days | Discharge: LEFT BEFORE DISCHARGE | DRG: 194 | End: 2025-05-22
Attending: EMERGENCY MEDICINE | Admitting: STUDENT IN AN ORGANIZED HEALTH CARE EDUCATION/TRAINING PROGRAM
Payer: MEDICAID

## 2025-05-21 DIAGNOSIS — J18.9 PNEUMONIA OF RIGHT LOWER LOBE DUE TO INFECTIOUS ORGANISM: ICD-10-CM

## 2025-05-21 DIAGNOSIS — I50.9 ACUTE CONGESTIVE HEART FAILURE, UNSPECIFIED HEART FAILURE TYPE (HCC): Primary | ICD-10-CM

## 2025-05-21 DIAGNOSIS — I50.23 ACUTE ON CHRONIC SYSTOLIC CONGESTIVE HEART FAILURE (HCC): ICD-10-CM

## 2025-05-21 DIAGNOSIS — I31.39 PERICARDIAL EFFUSION: ICD-10-CM

## 2025-05-21 LAB
ALBUMIN SERPL-MCNC: 3.7 G/DL (ref 3.4–5)
ALBUMIN/GLOB SERPL: 1.2 {RATIO} (ref 1.1–2.2)
ALP SERPL-CCNC: 87 U/L (ref 40–129)
ALT SERPL-CCNC: 33 U/L (ref 10–40)
ANION GAP SERPL CALCULATED.3IONS-SCNC: 11 MMOL/L (ref 3–16)
AST SERPL-CCNC: 48 U/L (ref 15–37)
BASOPHILS # BLD: 0 K/UL (ref 0–0.2)
BASOPHILS NFR BLD: 0.2 %
BILIRUB SERPL-MCNC: 0.4 MG/DL (ref 0–1)
BUN SERPL-MCNC: 14 MG/DL (ref 7–20)
CALCIUM SERPL-MCNC: 8.6 MG/DL (ref 8.3–10.6)
CHLORIDE SERPL-SCNC: 107 MMOL/L (ref 99–110)
CO2 SERPL-SCNC: 23 MMOL/L (ref 21–32)
CREAT SERPL-MCNC: 1 MG/DL (ref 0.6–1.1)
D-DIMER QUANTITATIVE: 0.82 UG/ML FEU (ref 0–0.6)
DEPRECATED RDW RBC AUTO: 17.1 % (ref 12.4–15.4)
EOSINOPHIL # BLD: 0 K/UL (ref 0–0.6)
EOSINOPHIL NFR BLD: 0.9 %
FERRITIN SERPL IA-MCNC: 160 NG/ML (ref 15–150)
GFR SERPLBLD CREATININE-BSD FMLA CKD-EPI: 71 ML/MIN/{1.73_M2}
GLUCOSE SERPL-MCNC: 121 MG/DL (ref 70–99)
HCG SERPL QL: NEGATIVE
HCT VFR BLD AUTO: 31.4 % (ref 36–48)
HGB BLD-MCNC: 10.2 G/DL (ref 12–16)
IRON SATN MFR SERPL: 14 % (ref 15–50)
IRON SERPL-MCNC: 38 UG/DL (ref 37–145)
LYMPHOCYTES # BLD: 0.4 K/UL (ref 1–5.1)
LYMPHOCYTES NFR BLD: 7.8 %
MACROCYTES BLD QL SMEAR: ABNORMAL
MCH RBC QN AUTO: 27.8 PG (ref 26–34)
MCHC RBC AUTO-ENTMCNC: 32.6 G/DL (ref 31–36)
MCV RBC AUTO: 85.5 FL (ref 80–100)
MONOCYTES # BLD: 0.1 K/UL (ref 0–1.3)
MONOCYTES NFR BLD: 1.5 %
NEUTROPHILS # BLD: 4.3 K/UL (ref 1.7–7.7)
NEUTROPHILS NFR BLD: 89.6 %
NT-PROBNP SERPL-MCNC: 8476 PG/ML (ref 0–124)
OVALOCYTES BLD QL SMEAR: ABNORMAL
PLATELET # BLD AUTO: 139 K/UL (ref 135–450)
PLATELET BLD QL SMEAR: ADEQUATE
PMV BLD AUTO: 8.4 FL (ref 5–10.5)
POTASSIUM SERPL-SCNC: 4.1 MMOL/L (ref 3.5–5.1)
PROCALCITONIN SERPL IA-MCNC: 0.14 NG/ML (ref 0–0.15)
PROT SERPL-MCNC: 6.7 G/DL (ref 6.4–8.2)
RBC # BLD AUTO: 3.67 M/UL (ref 4–5.2)
SCHISTOCYTES BLD QL SMEAR: ABNORMAL
SLIDE REVIEW: ABNORMAL
SODIUM SERPL-SCNC: 141 MMOL/L (ref 136–145)
TIBC SERPL-MCNC: 268 UG/DL (ref 260–445)
TROPONIN, HIGH SENSITIVITY: 13 NG/L (ref 0–14)
TSH SERPL DL<=0.005 MIU/L-ACNC: 1.05 UIU/ML (ref 0.27–4.2)
WBC # BLD AUTO: 4.8 K/UL (ref 4–11)

## 2025-05-21 PROCEDURE — 2500000003 HC RX 250 WO HCPCS: Performed by: STUDENT IN AN ORGANIZED HEALTH CARE EDUCATION/TRAINING PROGRAM

## 2025-05-21 PROCEDURE — 71046 X-RAY EXAM CHEST 2 VIEWS: CPT

## 2025-05-21 PROCEDURE — 2500000003 HC RX 250 WO HCPCS: Performed by: EMERGENCY MEDICINE

## 2025-05-21 PROCEDURE — 82728 ASSAY OF FERRITIN: CPT

## 2025-05-21 PROCEDURE — 1200000000 HC SEMI PRIVATE

## 2025-05-21 PROCEDURE — 93005 ELECTROCARDIOGRAM TRACING: CPT | Performed by: EMERGENCY MEDICINE

## 2025-05-21 PROCEDURE — 85025 COMPLETE CBC W/AUTO DIFF WBC: CPT

## 2025-05-21 PROCEDURE — 36415 COLL VENOUS BLD VENIPUNCTURE: CPT

## 2025-05-21 PROCEDURE — 2580000003 HC RX 258: Performed by: EMERGENCY MEDICINE

## 2025-05-21 PROCEDURE — 87536 HIV-1 QUANT&REVRSE TRNSCRPJ: CPT

## 2025-05-21 PROCEDURE — 6370000000 HC RX 637 (ALT 250 FOR IP): Performed by: EMERGENCY MEDICINE

## 2025-05-21 PROCEDURE — 84145 PROCALCITONIN (PCT): CPT

## 2025-05-21 PROCEDURE — 86360 T CELL ABSOLUTE COUNT/RATIO: CPT

## 2025-05-21 PROCEDURE — 99285 EMERGENCY DEPT VISIT HI MDM: CPT

## 2025-05-21 PROCEDURE — 80053 COMPREHEN METABOLIC PANEL: CPT

## 2025-05-21 PROCEDURE — 84484 ASSAY OF TROPONIN QUANT: CPT

## 2025-05-21 PROCEDURE — 85379 FIBRIN DEGRADATION QUANT: CPT

## 2025-05-21 PROCEDURE — 83550 IRON BINDING TEST: CPT

## 2025-05-21 PROCEDURE — 96374 THER/PROPH/DIAG INJ IV PUSH: CPT

## 2025-05-21 PROCEDURE — 96375 TX/PRO/DX INJ NEW DRUG ADDON: CPT

## 2025-05-21 PROCEDURE — 83540 ASSAY OF IRON: CPT

## 2025-05-21 PROCEDURE — 84443 ASSAY THYROID STIM HORMONE: CPT

## 2025-05-21 PROCEDURE — 6360000002 HC RX W HCPCS: Performed by: STUDENT IN AN ORGANIZED HEALTH CARE EDUCATION/TRAINING PROGRAM

## 2025-05-21 PROCEDURE — 71260 CT THORAX DX C+: CPT

## 2025-05-21 PROCEDURE — 6370000000 HC RX 637 (ALT 250 FOR IP): Performed by: STUDENT IN AN ORGANIZED HEALTH CARE EDUCATION/TRAINING PROGRAM

## 2025-05-21 PROCEDURE — 6360000002 HC RX W HCPCS: Performed by: EMERGENCY MEDICINE

## 2025-05-21 PROCEDURE — 84703 CHORIONIC GONADOTROPIN ASSAY: CPT

## 2025-05-21 PROCEDURE — 87040 BLOOD CULTURE FOR BACTERIA: CPT

## 2025-05-21 PROCEDURE — 83880 ASSAY OF NATRIURETIC PEPTIDE: CPT

## 2025-05-21 PROCEDURE — 6360000004 HC RX CONTRAST MEDICATION: Performed by: EMERGENCY MEDICINE

## 2025-05-21 PROCEDURE — 94640 AIRWAY INHALATION TREATMENT: CPT

## 2025-05-21 RX ORDER — ENOXAPARIN SODIUM 100 MG/ML
40 INJECTION SUBCUTANEOUS DAILY
Status: DISCONTINUED | OUTPATIENT
Start: 2025-05-21 | End: 2025-05-22 | Stop reason: HOSPADM

## 2025-05-21 RX ORDER — ONDANSETRON 2 MG/ML
4 INJECTION INTRAMUSCULAR; INTRAVENOUS EVERY 6 HOURS PRN
Status: DISCONTINUED | OUTPATIENT
Start: 2025-05-21 | End: 2025-05-22 | Stop reason: HOSPADM

## 2025-05-21 RX ORDER — ATORVASTATIN CALCIUM 40 MG/1
40 TABLET, FILM COATED ORAL NIGHTLY
Status: DISCONTINUED | OUTPATIENT
Start: 2025-05-21 | End: 2025-05-22 | Stop reason: HOSPADM

## 2025-05-21 RX ORDER — IPRATROPIUM BROMIDE AND ALBUTEROL SULFATE 2.5; .5 MG/3ML; MG/3ML
1 SOLUTION RESPIRATORY (INHALATION) ONCE
Status: COMPLETED | OUTPATIENT
Start: 2025-05-21 | End: 2025-05-21

## 2025-05-21 RX ORDER — CARVEDILOL 25 MG/1
25 TABLET ORAL 2 TIMES DAILY WITH MEALS
Status: DISCONTINUED | OUTPATIENT
Start: 2025-05-21 | End: 2025-05-22 | Stop reason: HOSPADM

## 2025-05-21 RX ORDER — SODIUM CHLORIDE 0.9 % (FLUSH) 0.9 %
5-40 SYRINGE (ML) INJECTION PRN
Status: DISCONTINUED | OUTPATIENT
Start: 2025-05-21 | End: 2025-05-22 | Stop reason: HOSPADM

## 2025-05-21 RX ORDER — FAMOTIDINE 20 MG/1
20 TABLET, FILM COATED ORAL 2 TIMES DAILY
Status: DISCONTINUED | OUTPATIENT
Start: 2025-05-21 | End: 2025-05-22 | Stop reason: HOSPADM

## 2025-05-21 RX ORDER — TACROLIMUS 1 MG/G
1 OINTMENT TOPICAL 2 TIMES DAILY
Status: DISCONTINUED | OUTPATIENT
Start: 2025-05-21 | End: 2025-05-22 | Stop reason: HOSPADM

## 2025-05-21 RX ORDER — IOPAMIDOL 755 MG/ML
100 INJECTION, SOLUTION INTRAVASCULAR
Status: COMPLETED | OUTPATIENT
Start: 2025-05-21 | End: 2025-05-21

## 2025-05-21 RX ORDER — ALBUTEROL SULFATE 90 UG/1
2 INHALANT RESPIRATORY (INHALATION) EVERY 4 HOURS PRN
Status: DISCONTINUED | OUTPATIENT
Start: 2025-05-21 | End: 2025-05-22 | Stop reason: HOSPADM

## 2025-05-21 RX ORDER — MAGNESIUM SULFATE IN WATER 40 MG/ML
2000 INJECTION, SOLUTION INTRAVENOUS PRN
Status: DISCONTINUED | OUTPATIENT
Start: 2025-05-21 | End: 2025-05-22 | Stop reason: HOSPADM

## 2025-05-21 RX ORDER — SODIUM CHLORIDE 9 MG/ML
INJECTION, SOLUTION INTRAVENOUS PRN
Status: DISCONTINUED | OUTPATIENT
Start: 2025-05-21 | End: 2025-05-22 | Stop reason: HOSPADM

## 2025-05-21 RX ORDER — ACETAMINOPHEN 650 MG/1
650 SUPPOSITORY RECTAL EVERY 6 HOURS PRN
Status: DISCONTINUED | OUTPATIENT
Start: 2025-05-21 | End: 2025-05-22 | Stop reason: HOSPADM

## 2025-05-21 RX ORDER — ACETAMINOPHEN 325 MG/1
650 TABLET ORAL EVERY 6 HOURS PRN
Status: DISCONTINUED | OUTPATIENT
Start: 2025-05-21 | End: 2025-05-22 | Stop reason: HOSPADM

## 2025-05-21 RX ORDER — HYDRALAZINE HYDROCHLORIDE 50 MG/1
100 TABLET, FILM COATED ORAL EVERY 8 HOURS SCHEDULED
Status: DISCONTINUED | OUTPATIENT
Start: 2025-05-21 | End: 2025-05-22 | Stop reason: HOSPADM

## 2025-05-21 RX ORDER — AZITHROMYCIN 500 MG/1
500 TABLET, FILM COATED ORAL DAILY
Status: DISCONTINUED | OUTPATIENT
Start: 2025-05-22 | End: 2025-05-22

## 2025-05-21 RX ORDER — PREDNISONE 20 MG/1
60 TABLET ORAL ONCE
Status: COMPLETED | OUTPATIENT
Start: 2025-05-21 | End: 2025-05-21

## 2025-05-21 RX ORDER — POLYETHYLENE GLYCOL 3350 17 G/17G
17 POWDER, FOR SOLUTION ORAL DAILY PRN
Status: DISCONTINUED | OUTPATIENT
Start: 2025-05-21 | End: 2025-05-22 | Stop reason: HOSPADM

## 2025-05-21 RX ORDER — POTASSIUM CHLORIDE 7.45 MG/ML
10 INJECTION INTRAVENOUS PRN
Status: DISCONTINUED | OUTPATIENT
Start: 2025-05-21 | End: 2025-05-22 | Stop reason: HOSPADM

## 2025-05-21 RX ORDER — TRIAMCINOLONE ACETONIDE 1 MG/G
1 OINTMENT TOPICAL 2 TIMES DAILY
Status: DISCONTINUED | OUTPATIENT
Start: 2025-05-21 | End: 2025-05-22 | Stop reason: HOSPADM

## 2025-05-21 RX ORDER — ONDANSETRON 4 MG/1
4 TABLET, ORALLY DISINTEGRATING ORAL EVERY 8 HOURS PRN
Status: DISCONTINUED | OUTPATIENT
Start: 2025-05-21 | End: 2025-05-22 | Stop reason: HOSPADM

## 2025-05-21 RX ORDER — FUROSEMIDE 10 MG/ML
40 INJECTION INTRAMUSCULAR; INTRAVENOUS ONCE
Status: COMPLETED | OUTPATIENT
Start: 2025-05-21 | End: 2025-05-21

## 2025-05-21 RX ORDER — FUROSEMIDE 10 MG/ML
40 INJECTION INTRAMUSCULAR; INTRAVENOUS 2 TIMES DAILY
Status: DISCONTINUED | OUTPATIENT
Start: 2025-05-21 | End: 2025-05-22

## 2025-05-21 RX ORDER — SODIUM CHLORIDE 0.9 % (FLUSH) 0.9 %
5-40 SYRINGE (ML) INJECTION EVERY 12 HOURS SCHEDULED
Status: DISCONTINUED | OUTPATIENT
Start: 2025-05-21 | End: 2025-05-22 | Stop reason: HOSPADM

## 2025-05-21 RX ORDER — SPIRONOLACTONE 25 MG/1
25 TABLET ORAL DAILY
Status: DISCONTINUED | OUTPATIENT
Start: 2025-05-22 | End: 2025-05-22 | Stop reason: HOSPADM

## 2025-05-21 RX ORDER — POTASSIUM CHLORIDE 1500 MG/1
40 TABLET, EXTENDED RELEASE ORAL PRN
Status: DISCONTINUED | OUTPATIENT
Start: 2025-05-21 | End: 2025-05-22 | Stop reason: HOSPADM

## 2025-05-21 RX ORDER — BUDESONIDE AND FORMOTEROL FUMARATE DIHYDRATE 160; 4.5 UG/1; UG/1
2 AEROSOL RESPIRATORY (INHALATION) 2 TIMES DAILY
Status: DISCONTINUED | OUTPATIENT
Start: 2025-05-21 | End: 2025-05-22 | Stop reason: HOSPADM

## 2025-05-21 RX ADMIN — BUDESONIDE AND FORMOTEROL FUMARATE DIHYDRATE 2 PUFF: 160; 4.5 AEROSOL RESPIRATORY (INHALATION) at 20:35

## 2025-05-21 RX ADMIN — ATORVASTATIN CALCIUM 40 MG: 40 TABLET, FILM COATED ORAL at 21:27

## 2025-05-21 RX ADMIN — HYDRALAZINE HYDROCHLORIDE 100 MG: 50 TABLET ORAL at 21:27

## 2025-05-21 RX ADMIN — CARVEDILOL 25 MG: 25 TABLET, FILM COATED ORAL at 17:23

## 2025-05-21 RX ADMIN — ALBUTEROL SULFATE 2 PUFF: 90 AEROSOL, METERED RESPIRATORY (INHALATION) at 20:58

## 2025-05-21 RX ADMIN — ACETAMINOPHEN 650 MG: 325 TABLET ORAL at 23:39

## 2025-05-21 RX ADMIN — IPRATROPIUM BROMIDE AND ALBUTEROL SULFATE 1 DOSE: .5; 2.5 SOLUTION RESPIRATORY (INHALATION) at 14:16

## 2025-05-21 RX ADMIN — SACUBITRIL AND VALSARTAN 1 TABLET: 49; 51 TABLET, FILM COATED ORAL at 21:42

## 2025-05-21 RX ADMIN — FUROSEMIDE 40 MG: 10 INJECTION, SOLUTION INTRAMUSCULAR; INTRAVENOUS at 12:49

## 2025-05-21 RX ADMIN — IPRATROPIUM BROMIDE AND ALBUTEROL SULFATE 1 DOSE: .5; 2.5 SOLUTION RESPIRATORY (INHALATION) at 11:09

## 2025-05-21 RX ADMIN — SODIUM CHLORIDE, PRESERVATIVE FREE 10 ML: 5 INJECTION INTRAVENOUS at 21:28

## 2025-05-21 RX ADMIN — TRIAMCINOLONE ACETONIDE 1 G: 1 OINTMENT TOPICAL at 21:42

## 2025-05-21 RX ADMIN — AZITHROMYCIN MONOHYDRATE 500 MG: 500 INJECTION, POWDER, LYOPHILIZED, FOR SOLUTION INTRAVENOUS at 12:58

## 2025-05-21 RX ADMIN — FAMOTIDINE 20 MG: 20 TABLET, FILM COATED ORAL at 21:27

## 2025-05-21 RX ADMIN — FUROSEMIDE 40 MG: 10 INJECTION, SOLUTION INTRAMUSCULAR; INTRAVENOUS at 17:24

## 2025-05-21 RX ADMIN — IOPAMIDOL 100 ML: 755 INJECTION, SOLUTION INTRAVENOUS at 11:40

## 2025-05-21 RX ADMIN — PREDNISONE 60 MG: 20 TABLET ORAL at 11:09

## 2025-05-21 RX ADMIN — WATER 1000 MG: 1 INJECTION INTRAMUSCULAR; INTRAVENOUS; SUBCUTANEOUS at 11:54

## 2025-05-21 ASSESSMENT — PAIN DESCRIPTION - DESCRIPTORS: DESCRIPTORS: ACHING;DISCOMFORT

## 2025-05-21 ASSESSMENT — PAIN SCALES - GENERAL
PAINLEVEL_OUTOF10: 2
PAINLEVEL_OUTOF10: 2

## 2025-05-21 ASSESSMENT — PAIN - FUNCTIONAL ASSESSMENT
PAIN_FUNCTIONAL_ASSESSMENT: NONE - DENIES PAIN
PAIN_FUNCTIONAL_ASSESSMENT: NONE - DENIES PAIN

## 2025-05-21 ASSESSMENT — PAIN DESCRIPTION - LOCATION: LOCATION: HEAD

## 2025-05-21 NOTE — ED PROVIDER NOTES
congestive heart failure with worsening moderate to large pericardial effusion.  Small right pleural effusion.  Moderate to severe interstitial edema in both lungs.  Patchy groundglass opacities favoring pulmonary edema.    The patient is hemodynamically stable.  No clinical evidence of tamponade.  No hypotension.  She will need further evaluation with 2D echocardiogram which is not available at this facility.    Consult was placed to cardiology. Care was discussed wt Dr. Heck.       She was given Lasix 40 mg IV.  She has not yet taken her furosemide today.    She was given Rocephin and Zithromax to cover the possibility of community-acquired pneumonia.  She does have productive white sputum.    She will be admitted for further treatment evaluation.  A call was placed to the hospitalist on-call at [facility] for admission.  She will be transferred by ALS ambulance    I am the primary attending of record.    CRITICAL CARE TIME   Total Critical Care time was 0 minutes, excluding separately reportable procedures.  There was a high probability of clinically significant/life threatening deterioration in the patient's condition which required my urgent intervention.      CONSULTS:  IP CONSULT TO CARDIOLOGY  IP CONSULT TO HEART FAILURE NURSE/COORDINATOR  IP CONSULT TO CARDIOLOGY    PROCEDURES:  Unless otherwise noted below, none     Procedures        FINAL IMPRESSION      1. Acute congestive heart failure, unspecified heart failure type (HCC)    2. Pneumonia of right lower lobe due to infectious organism    3. Pericardial effusion    4. Acute on chronic systolic congestive heart failure (HCC)          DISPOSITION/PLAN   DISPOSITION Admitted 05/21/2025 12:53:43 PM      PATIENT REFERRED TO:  No follow-up provider specified.    DISCHARGE MEDICATIONS:  New Prescriptions    No medications on file     Controlled Substances Monitoring:          No data to display                (Please note that portions of this note were

## 2025-05-21 NOTE — H&P
effusion.     Right basilar pneumonia.         Electronically signed by Ameya Salinas DO on 5/21/2025 at 5:01 PM

## 2025-05-21 NOTE — FLOWSHEET NOTE
Pt to room 4123 from [place] ED. Vitals listed below. 4 eyes and admission done. Pt oriented to room and call light. Pt ambulated in room with steady gait. Pt in bed with no expressed needs at this time. Call light within reach. Care ongoing. Electronically signed by Zach Portillo RN on 5/21/25 at 4:53 PM EDT    05/21/25 1645   Vital Signs   Temp 98.1 °F (36.7 °C)   Temp Source Oral   Pulse 99   Heart Rate Source Monitor   Respirations 18   BP (!) 125/92   MAP (Calculated) 103   MAP (mmHg) 102   BP Location Left upper arm   Patient Position Sitting   Pain Assessment   Pain Assessment None - Denies Pain   Oxygen Therapy   SpO2 100 %   O2 Device None (Room air)   Height and Weight   Height 1.702 m (5' 7\")   Weight - Scale 61.8 kg (136 lb 3.9 oz)   Weight Method Actual;Standing scale   BSA (Calculated - sq m) 1.71 sq meters   BMI (Calculated) 21.4

## 2025-05-21 NOTE — ED NOTES
Report give to RN at [facility] 4W. RN has no further questions at this time.

## 2025-05-21 NOTE — DISCHARGE INSTRUCTIONS
Extra Heart Failure Education/ Tools/ Resources:     https://[employer].com/publication/?j=704767   --- this is American Heart Association interactive Healthier Living with Heart Failure guidebook.  Please click hyperlink or copy / paste link into search bar. The QR Code is also available below. Use your mouse to scroll through the pages.  Lots of information about weight monitoring, diet tips, activity, meds, etc    Heart Failure Tools and Resources QR Code is below. It includes multiple resources to include symptom tracker, med tracker, further HF info, and access to a HF Support Network online Community    HF [place]  -- this is a free smart phone kenzie available for iPhone and Android download.  Use your phone to track sodium / fluid intake, zone tool symptom tracking, weights, medications, etc. Click on this hyperlink  HF [place]   for QR code for easy download or the link is also found in the below HF Tools and Resources.      DASH (Dietary Approach to Stop Hypertension) diet --  https://www.nhlbi.nih.gov/education/dash-eating-plan -- this diet is a flexible eating plan that promotes heart healthy eating style.  Click on hyperlink or copy / paste link into search bar.  Lots of low sodium recipes and tips.    https://www.[employer].Drizly/recipes  -- more free recipes

## 2025-05-22 ENCOUNTER — APPOINTMENT (OUTPATIENT)
Age: 45
DRG: 194 | End: 2025-05-22
Attending: STUDENT IN AN ORGANIZED HEALTH CARE EDUCATION/TRAINING PROGRAM
Payer: MEDICAID

## 2025-05-22 VITALS
RESPIRATION RATE: 18 BRPM | SYSTOLIC BLOOD PRESSURE: 107 MMHG | BODY MASS INDEX: 21.19 KG/M2 | HEART RATE: 83 BPM | TEMPERATURE: 98.2 F | WEIGHT: 135 LBS | DIASTOLIC BLOOD PRESSURE: 74 MMHG | OXYGEN SATURATION: 100 % | HEIGHT: 67 IN

## 2025-05-22 LAB
ALBUMIN SERPL-MCNC: 3.6 G/DL (ref 3.4–5)
ALP SERPL-CCNC: 98 U/L (ref 40–129)
ALT SERPL-CCNC: 29 U/L (ref 10–40)
ANION GAP SERPL CALCULATED.3IONS-SCNC: 14 MMOL/L (ref 3–16)
AST SERPL-CCNC: 40 U/L (ref 15–37)
BILIRUB DIRECT SERPL-MCNC: <0.1 MG/DL (ref 0–0.3)
BILIRUB INDIRECT SERPL-MCNC: 0.2 MG/DL (ref 0–1)
BILIRUB SERPL-MCNC: 0.3 MG/DL (ref 0–1)
BUN SERPL-MCNC: 23 MG/DL (ref 7–20)
CALCIUM SERPL-MCNC: 8.8 MG/DL (ref 8.3–10.6)
CHLORIDE SERPL-SCNC: 107 MMOL/L (ref 99–110)
CHOLEST SERPL-MCNC: 207 MG/DL (ref 0–199)
CO2 SERPL-SCNC: 22 MMOL/L (ref 21–32)
CREAT SERPL-MCNC: 1.1 MG/DL (ref 0.6–1.1)
ECHO AO ASC DIAM: 2.8 CM
ECHO AO ASCENDING AORTA INDEX: 1.64 CM/M2
ECHO AO ROOT DIAM: 2.9 CM
ECHO AO ROOT INDEX: 1.7 CM/M2
ECHO AV AREA PEAK VELOCITY: 2.1 CM2
ECHO AV AREA VTI: 2 CM2
ECHO AV AREA/BSA PEAK VELOCITY: 1.2 CM2/M2
ECHO AV AREA/BSA VTI: 1.2 CM2/M2
ECHO AV MEAN GRADIENT: 4 MMHG
ECHO AV MEAN VELOCITY: 1 M/S
ECHO AV PEAK GRADIENT: 6 MMHG
ECHO AV PEAK VELOCITY: 1.3 M/S
ECHO AV VELOCITY RATIO: 0.69
ECHO AV VTI: 25.4 CM
ECHO BSA: 1.7 M2
ECHO EST RA PRESSURE: 8 MMHG
ECHO IVC EXP: 2.4 CM
ECHO LA AREA 2C: 22 CM2
ECHO LA AREA 4C: 21.6 CM2
ECHO LA MAJOR AXIS: 5.2 CM
ECHO LA MINOR AXIS: 5.4 CM
ECHO LA VOL BP: 72 ML (ref 22–52)
ECHO LA VOL MOD A2C: 70 ML (ref 22–52)
ECHO LA VOL MOD A4C: 71 ML (ref 22–52)
ECHO LA VOL/BSA BIPLANE: 42 ML/M2 (ref 16–34)
ECHO LA VOLUME INDEX MOD A2C: 41 ML/M2 (ref 16–34)
ECHO LA VOLUME INDEX MOD A4C: 42 ML/M2 (ref 16–34)
ECHO LV E' LATERAL VELOCITY: 8.58 CM/S
ECHO LV E' SEPTAL VELOCITY: 5.84 CM/S
ECHO LV EF PHYSICIAN: 23 %
ECHO LV FRACTIONAL SHORTENING: 13 % (ref 28–44)
ECHO LV INTERNAL DIMENSION DIASTOLE INDEX: 3.1 CM/M2
ECHO LV INTERNAL DIMENSION DIASTOLIC: 5.3 CM (ref 3.9–5.3)
ECHO LV INTERNAL DIMENSION SYSTOLIC INDEX: 2.69 CM/M2
ECHO LV INTERNAL DIMENSION SYSTOLIC: 4.6 CM
ECHO LV IVSD: 1.1 CM (ref 0.6–0.9)
ECHO LV MASS 2D: 227.7 G (ref 67–162)
ECHO LV MASS INDEX 2D: 133.2 G/M2 (ref 43–95)
ECHO LV POSTERIOR WALL DIASTOLIC: 1.1 CM (ref 0.6–0.9)
ECHO LV RELATIVE WALL THICKNESS RATIO: 0.42
ECHO LVOT AREA: 3.1 CM2
ECHO LVOT AV VTI INDEX: 0.66
ECHO LVOT DIAM: 2 CM
ECHO LVOT MEAN GRADIENT: 2 MMHG
ECHO LVOT PEAK GRADIENT: 3 MMHG
ECHO LVOT PEAK VELOCITY: 0.9 M/S
ECHO LVOT STROKE VOLUME INDEX: 30.7 ML/M2
ECHO LVOT SV: 52.4 ML
ECHO LVOT VTI: 16.7 CM
ECHO MV A VELOCITY: 0.54 M/S
ECHO MV AREA VTI: 1.9 CM2
ECHO MV E DECELERATION TIME (DT): 159 MS
ECHO MV E VELOCITY: 1.3 M/S
ECHO MV E/A RATIO: 2.41
ECHO MV E/E' LATERAL: 15.15
ECHO MV E/E' RATIO (AVERAGED): 18.71
ECHO MV E/E' SEPTAL: 22.26
ECHO MV LVOT VTI INDEX: 1.68
ECHO MV MAX VELOCITY: 1.4 M/S
ECHO MV MEAN GRADIENT: 3 MMHG
ECHO MV MEAN VELOCITY: 0.7 M/S
ECHO MV PEAK GRADIENT: 8 MMHG
ECHO MV REGURGITANT ALIASING (NYQUIST) VELOCITY: 23 CM/S
ECHO MV REGURGITANT PEAK GRADIENT: 104 MMHG
ECHO MV REGURGITANT PEAK VELOCITY: 5.1 M/S
ECHO MV REGURGITANT RADIUS PISA: 0.81 CM
ECHO MV REGURGITANT VTIA: 171 CM
ECHO MV VTI: 28 CM
ECHO PV MAX VELOCITY: 0.8 M/S
ECHO PV PEAK GRADIENT: 3 MMHG
ECHO RA AREA 4C: 18.4 CM2
ECHO RA END SYSTOLIC VOLUME APICAL 4 CHAMBER INDEX BSA: 33 ML/M2
ECHO RA VOLUME: 56 ML
ECHO RV BASAL DIMENSION: 4.1 CM
ECHO RV FREE WALL PEAK S': 11.7 CM/S
ECHO RV MID DIMENSION: 2.6 CM
ECHO RV TAPSE: 1.3 CM (ref 1.7–?)
EKG ATRIAL RATE: 112 BPM
EKG DIAGNOSIS: NORMAL
EKG P AXIS: 55 DEGREES
EKG P-R INTERVAL: 130 MS
EKG Q-T INTERVAL: 294 MS
EKG QRS DURATION: 72 MS
EKG QTC CALCULATION (BAZETT): 401 MS
EKG R AXIS: 84 DEGREES
EKG T AXIS: 83 DEGREES
EKG VENTRICULAR RATE: 112 BPM
GFR SERPLBLD CREATININE-BSD FMLA CKD-EPI: 63 ML/MIN/{1.73_M2}
GLUCOSE SERPL-MCNC: 104 MG/DL (ref 70–99)
HDLC SERPL-MCNC: 48 MG/DL (ref 40–60)
LDLC SERPL CALC-MCNC: 122 MG/DL
MAGNESIUM SERPL-MCNC: 1.68 MG/DL (ref 1.8–2.4)
POTASSIUM SERPL-SCNC: 4 MMOL/L (ref 3.5–5.1)
PROT SERPL-MCNC: 6.9 G/DL (ref 6.4–8.2)
SODIUM SERPL-SCNC: 143 MMOL/L (ref 136–145)
TRIGL SERPL-MCNC: 187 MG/DL (ref 0–150)
VLDLC SERPL CALC-MCNC: 37 MG/DL

## 2025-05-22 PROCEDURE — 6360000002 HC RX W HCPCS: Performed by: STUDENT IN AN ORGANIZED HEALTH CARE EDUCATION/TRAINING PROGRAM

## 2025-05-22 PROCEDURE — 94760 N-INVAS EAR/PLS OXIMETRY 1: CPT

## 2025-05-22 PROCEDURE — 6370000000 HC RX 637 (ALT 250 FOR IP): Performed by: STUDENT IN AN ORGANIZED HEALTH CARE EDUCATION/TRAINING PROGRAM

## 2025-05-22 PROCEDURE — 2500000003 HC RX 250 WO HCPCS: Performed by: STUDENT IN AN ORGANIZED HEALTH CARE EDUCATION/TRAINING PROGRAM

## 2025-05-22 PROCEDURE — 93010 ELECTROCARDIOGRAM REPORT: CPT | Performed by: INTERNAL MEDICINE

## 2025-05-22 PROCEDURE — 94640 AIRWAY INHALATION TREATMENT: CPT

## 2025-05-22 PROCEDURE — 93306 TTE W/DOPPLER COMPLETE: CPT

## 2025-05-22 RX ORDER — FUROSEMIDE 10 MG/ML
40 INJECTION INTRAMUSCULAR; INTRAVENOUS DAILY
Status: DISCONTINUED | OUTPATIENT
Start: 2025-05-22 | End: 2025-05-22 | Stop reason: HOSPADM

## 2025-05-22 RX ORDER — DARUNAVIR, COBICISTAT, EMTRICITABINE, AND TENOFOVIR ALAFENAMIDE 800; 150; 200; 10 MG/1; MG/1; MG/1; MG/1
1 TABLET, FILM COATED ORAL DAILY
Status: ON HOLD | COMMUNITY
End: 2025-05-22

## 2025-05-22 RX ORDER — DIPHENHYDRAMINE HCL 25 MG
25 TABLET ORAL EVERY 6 HOURS PRN
Status: DISCONTINUED | OUTPATIENT
Start: 2025-05-22 | End: 2025-05-22 | Stop reason: HOSPADM

## 2025-05-22 RX ORDER — CLOBETASOL PROPIONATE 0.5 MG/G
OINTMENT TOPICAL 2 TIMES DAILY
COMMUNITY

## 2025-05-22 RX ADMIN — HYDRALAZINE HYDROCHLORIDE 100 MG: 50 TABLET ORAL at 04:23

## 2025-05-22 RX ADMIN — CARVEDILOL 25 MG: 25 TABLET, FILM COATED ORAL at 08:38

## 2025-05-22 RX ADMIN — SACUBITRIL AND VALSARTAN 1 TABLET: 49; 51 TABLET, FILM COATED ORAL at 08:38

## 2025-05-22 RX ADMIN — BUDESONIDE AND FORMOTEROL FUMARATE DIHYDRATE 2 PUFF: 160; 4.5 AEROSOL RESPIRATORY (INHALATION) at 08:48

## 2025-05-22 RX ADMIN — SPIRONOLACTONE 25 MG: 25 TABLET ORAL at 08:38

## 2025-05-22 RX ADMIN — DIPHENHYDRAMINE HCL 25 MG: 25 TABLET ORAL at 10:45

## 2025-05-22 RX ADMIN — TRIAMCINOLONE ACETONIDE 1 G: 1 OINTMENT TOPICAL at 08:39

## 2025-05-22 RX ADMIN — FAMOTIDINE 20 MG: 20 TABLET, FILM COATED ORAL at 08:38

## 2025-05-22 RX ADMIN — SODIUM CHLORIDE, PRESERVATIVE FREE 10 ML: 5 INJECTION INTRAVENOUS at 08:39

## 2025-05-22 RX ADMIN — FUROSEMIDE 40 MG: 10 INJECTION, SOLUTION INTRAMUSCULAR; INTRAVENOUS at 08:38

## 2025-05-22 RX ADMIN — HYDRALAZINE HYDROCHLORIDE 100 MG: 50 TABLET ORAL at 14:19

## 2025-05-22 ASSESSMENT — PAIN SCALES - GENERAL: PAINLEVEL_OUTOF10: 8

## 2025-05-22 NOTE — PROGRESS NOTES
4 Eyes Skin Assessment     NAME:  Kiana Walls  YOB: 1980  MEDICAL RECORD NUMBER:  [id number]    The patient is being assessed for  Admission    I agree that at least one RN has performed a thorough Head to Toe Skin Assessment on the patient. ALL assessment sites listed below have been assessed.      Areas assessed by both nurses:    Head, Face, Ears, Shoulders, Back, Chest, Arms, Elbows, Hands, Sacrum. Buttock, Coccyx, Ischium, Legs. Feet and Heels, and Under Medical Devices         Does the Patient have a Wound? No noted wound(s)       Luis Prevention initiated by RN: Yes  Wound Care Orders initiated by RN: No    Pressure Injury (Stage 3,4, Unstageable, DTI, NWPT, and Complex wounds) if present, place Wound referral order by RN under : No    New Ostomies, if present place, Ostomy referral order under : No     Nurse 1 eSignature: Electronically signed by Zach Portillo RN on 5/21/25 at 4:54 PM EDT    **SHARE this note so that the co-signing nurse can place an eSignature**    Nurse 2 eSignature: Electronically signed by Justin N. Schoenung, RN on 5/21/25 at 6:03 PM EDT   
Advance Care Planning     Advance Care Planning Inpatient Note  [facility] Department    Today's Date: 5/21/2025  Unit: [facility] 4W MED SURG    Received request from IDT Member.  Upon review of chart and communication with care team, patient's decision making abilities are not in question.. Patient was/were present in the room during visit.    Goals of ACP Conversation:  Discuss advance care planning documents  Facilitate a discussion related to patient's goals of care as they align with the patient's values and beliefs.    Health Care Decision Makers:       Summary:  No Decision Maker named by patient at this time    Advance Care Planning Documents (Patient Wishes):  None     Assessment:      provided ACP documents for patient. Patient verbalized that pt needed to read and decide which family member pt would ask to be her agent. Pt will call HAYDE if pt wants to sign the documents during this hospitalization.   Interventions:  Discussed and provided education on state decision maker hierarchy  Encouraged ongoing ACP conversation with future decision makers and loved ones    Care Preferences Communicated:   No    Outcomes/Plan:  ACP Discussion: Completed    Electronically signed by Chaplain BRIGITTE on 5/21/2025 at 7:26 PM           
EDSBAR report has been reviewed.      Electronically signed by Zach Portillo RN on 5/21/2025 at 2:19 PM     
Medication Reconciliation    List of medications patient is currently taking is complete except for antiretroviral medications.     Source of information: 1. Conversation with patient over the phone                                      2. EPIC records      Notes regarding home medications:   1. Patient requesting refills for all medications.    2. Attempting to determine patient's antiretroviral regimen.     Appears regimen may be Symtuza and Pifeltro, however per [facility] Pilfeltro has not been filled since September 2024 and there is no record of Symtuza being filled.    Inpatient pharmacy does not stock either medication or all components. Patient not able to obtain from home to continue HIV regimen.      Left message with Dr. Bonds's office to determine regimen she is supposed to be taking. Once regimen confirmed, will see if ID wants regimen continued and will see if pharmacy can obtain for patient.              
Patient has an order for Tacrolimus ointment to be applied on face however per pharmacy, it is a Pt supplied item but not avaialble bedside. NP notified.    
Pt Ao x4. VSS. Pt denies pain when asked this AM. Morning medications given whole, tolerated well. Morning assessment completed. Pt turns self in bed will be reminded as needed, pt is UAL. Pt has no questions or concerns. Bed in lowest and locked position with call light in reach.     0740 pt to echo    0824 returned to room     Electronically signed by Vilma Carvalho RN on 5/22/2025 at 10:13 AM    
Pt choosing to leave AMA. Stressed about the situation their home was left in. Pt educated on the risks of leaving AMA, pt still wanting to leave despite trying to encourage pt to stay. IV removed no complications. Pt AO x4. AMA papers signed with patient. Pt walked down to private transportation.     Electronically signed by Vilma Carvalho RN on 5/22/2025 at 2:57 PM    
Pt verbalizes that she feels a little wobbly on her knees, advised to turn the bed alarm on however Pt refused.   
  ALKPHOS 87 98     Lipids:   Lab Results   Component Value Date/Time    CHOL 207 05/22/2025 06:19 AM    HDL 48 05/22/2025 06:19 AM    TRIG 187 05/22/2025 06:19 AM     Hemoglobin A1C: No results found for: \"LABA1C\"  TSH:   Lab Results   Component Value Date/Time    TSH 1.05 05/21/2025 05:23 PM     Troponin: No results found for: \"TROPONINT\"  Lactic Acid: No results for input(s): \"LACTA\" in the last 72 hours.  BNP:   Recent Labs     05/21/25  1105   PROBNP 8,476*     UA:  Lab Results   Component Value Date/Time    NITRU Negative 12/03/2024 10:00 AM    COLORU Yellow 12/03/2024 10:00 AM    PHUR 7.5 12/03/2024 10:00 AM    PHUR 8.0 06/06/2018 04:12 PM    WBCUA 22 12/03/2024 10:00 AM    RBCUA 9 12/03/2024 10:00 AM    MUCUS Present 12/03/2024 10:00 AM    BACTERIA Rare 12/03/2024 10:00 AM    CLARITYU Clear 12/03/2024 10:00 AM    LEUKOCYTESUR SMALL 12/03/2024 10:00 AM    UROBILINOGEN 1.0 12/03/2024 10:00 AM    BILIRUBINUR Negative 12/03/2024 10:00 AM    BLOODU Negative 12/03/2024 10:00 AM    GLUCOSEU Negative 12/03/2024 10:00 AM    KETUA TRACE 12/03/2024 10:00 AM     Urine Cultures:   Lab Results   Component Value Date/Time    LABURIN <10,000 CFU/ml  No further workup   12/03/2024 10:00 AM     Blood Cultures: No results found for: \"BC\"  No results found for: \"BLOODCULT2\"  Organism:   Lab Results   Component Value Date/Time    ORG Gram negative jimmy 12/03/2024 10:00 AM         Electronically signed by Ameya Salinas DO on 5/22/2025 at 10:31 AM

## 2025-05-22 NOTE — CARE COORDINATION
5/22 Patient not in room at time of visit. Will return as time allows or on 5/23/25.Electronically signed by Celena Villanueva RN on 5/22/2025 at 3:23 PM

## 2025-05-22 NOTE — DISCHARGE INSTR - COC
NO:}  Urinary Catheter: {Urinary Catheter:232123471}   Colostomy/Ileostomy/Ileal Conduit: {YES / NO:}       Date of Last BM: ***    Intake/Output Summary (Last 24 hours) at 2025 1532  Last data filed at 2025 1046  Gross per 24 hour   Intake 597 ml   Output 2500 ml   Net -1903 ml     I/O last 3 completed shifts:  In: 797 [P.O.:797]  Out: 1600 [Urine:1600]    Safety Concerns:     { RUTH Safety Concerns:794129095}    Impairments/Disabilities:      { RUTH Impairments/Disabilities:874449137}    Nutrition Therapy:  Current Nutrition Therapy:   { RUTH Diet List:933537330}    Routes of Feeding: {[facility] Other Feedings:327355295}  Liquids: {Slp liquid thickness:48576}  Daily Fluid Restriction: {[facility] DME Yes amt example:050781673}  Last Modified Barium Swallow with Video (Video Swallowing Test): {Done Not Done Date:}    Treatments at the Time of Hospital Discharge:   Respiratory Treatments: ***  Oxygen Therapy:  {Therapy; copd oxygen:49956}  Ventilator:    {[facility] Vent List:527974704}    Rehab Therapies: {THERAPEUTIC INTERVENTION:5261589901}  Weight Bearing Status/Restrictions: {[facility] Weight Bearin}  Other Medical Equipment (for information only, NOT a DME order):  {EQUIPMENT:642795105}  Other Treatments: ***        Patient's personal belongings (please select all that are sent with patient):  {[facility] DME Belongings:653511949}    RN SIGNATURE:  {Esignature:214295593}    CASE MANAGEMENT/SOCIAL WORK SECTION    Inpatient Status Date: ***    Readmission Risk Assessment Score:  [facility] RISK OF UNPLANNED READMISSION 2.0             12.3 Total Score        Discharging to Facility/ Agency   Name:   Address:  Phone:  Fax:    Dialysis Facility (if applicable)   Name:  Address:  Dialysis Schedule:  Phone:  Fax:    / signature: {Esignature:494642023}    PHYSICIAN SECTION    Prognosis: {Prognosis:4269749474}    Condition at Discharge: { Patient Condition:031869254}    Rehab

## 2025-05-22 NOTE — DISCHARGE SUMMARY
shampoo  Commonly known as: SELSUN BLUE     spironolactone 25 MG tablet  Commonly known as: ALDACTONE  Take 1 tablet by mouth daily     Symbicort 160-4.5 MCG/ACT Aero  Generic drug: budesonide-formoterol  Inhale 2 puffs by mouth twice daily     tacrolimus 0.1 % ointment  Commonly known as: PROTOPIC     triamcinolone 0.1 % ointment  Commonly known as: KENALOG           * This list has 2 medication(s) that are the same as other medications prescribed for you. Read the directions carefully, and ask your doctor or other care provider to review them with you.                 Objective Findings at Discharge:   /74   Pulse 83   Temp 98.2 °F (36.8 °C)   Resp 18   Ht 1.702 m (5' 7\")   Wt 61.2 kg (135 lb)   LMP 05/01/2025 (Approximate)   SpO2 100%   BMI 21.14 kg/m²       Physical Exam:   Left AGAINST MEDICAL ADVICE was not examined        Labs and Imaging   Echo (TTE) complete (PRN contrast/bubble/strain/3D)  Result Date: 5/22/2025    Left Ventricle: Severely reduced left ventricular systolic function with a visually estimated EF of 20 - 25%. Left ventricle size is normal. Mildly increased wall thickness. Severe global hypokinesis present. Grade III diastolic dysfunction with increased LAP.   Right Ventricle: Right ventricle is mildly dilated. Mildly reduced systolic function. TAPSE is 1.3 cm.   Mitral Valve: Moderately thickened leaflets. Severe regurgitation with mutiple Jets   Tricuspid Valve: Mild regurgitation.   Left Atrium: Left atrium is moderately dilated.   Right Atrium: Right atrium is mildly dilated.   Pericardium: Moderate circumferential pericardial effusion present. cannot exclude rt atrial diastolic collapse but no RV diastolic collapse to suggest pericardial tamponade   Extracardiac: Bilateral pleural effusion.   Image quality is adequate.     CT CHEST PULMONARY EMBOLISM W CONTRAST  Result Date: 5/21/2025  EXAMINATION: CTA OF THE CHEST 5/21/2025 11:35 am TECHNIQUE: CTA of the chest was performed

## 2025-05-22 NOTE — PLAN OF CARE
Problem: Chronic Conditions and Co-morbidities  Goal: Patient's chronic conditions and co-morbidity symptoms are monitored and maintained or improved  5/22/2025 1508 by Vilma Carvalho RN  Outcome: Completed  5/22/2025 1011 by Vilma Carvalho RN  Outcome: Progressing  5/22/2025 0213 by Daily Chan RN  Outcome: Progressing     Problem: Discharge Planning  Goal: Discharge to home or other facility with appropriate resources  5/22/2025 1508 by Vilma Carvalho RN  Outcome: Completed  5/22/2025 1011 by Vilma Carvalho RN  Outcome: Progressing  5/22/2025 0213 by Daily Chan RN  Outcome: Progressing     Problem: Pain  Goal: Verbalizes/displays adequate comfort level or baseline comfort level  5/22/2025 1508 by Vilma Carvalho RN  Outcome: Completed  5/22/2025 1011 by Vilma Carvalho RN  Outcome: Progressing  5/22/2025 0213 by Daily Chan RN  Outcome: Progressing     Problem: Safety - Adult  Goal: Free from fall injury  5/22/2025 1508 by Vilma Carvalho RN  Outcome: Completed  5/22/2025 1011 by Vilma Carvalho RN  Outcome: Progressing     Problem: Anxiety  Goal: Will report anxiety at manageable levels  Description: INTERVENTIONS:1. Administer medication as ordered2. Teach and rehearse alternative coping skills3. Provide emotional support with 1:1 interaction with staff  5/22/2025 1508 by Vilma Carvalho RN  Outcome: Completed  5/22/2025 1011 by Vilma Carvalho RN  Outcome: Progressing     Problem: Coping  Goal: Pt/Family able to verbalize concerns and demonstrate effective coping strategies  Description: INTERVENTIONS:1. Assist patient/family to identify coping skills, available support systems and cultural and spiritual values2. Provide emotional support, including active listening and acknowledgement of concerns of patient and caregivers3. Reduce environmental stimuli, as able4. Instruct patient/family in relaxation techniques, as appropriate5. Assess for spiritual 
  Problem: Chronic Conditions and Co-morbidities  Goal: Patient's chronic conditions and co-morbidity symptoms are monitored and maintained or improved  Outcome: Progressing     Problem: Discharge Planning  Goal: Discharge to home or other facility with appropriate resources  Outcome: Progressing     Problem: Pain  Goal: Verbalizes/displays adequate comfort level or baseline comfort level  Outcome: Progressing     
urinary retention  Outcome: Progressing     Problem: Infection - Adult  Goal: Absence of infection at discharge  Outcome: Progressing  Goal: Absence of infection during hospitalization  Outcome: Progressing  Goal: Absence of fever/infection during anticipated neutropenic period  Outcome: Progressing     Problem: Metabolic/Fluid and Electrolytes - Adult  Goal: Electrolytes maintained within normal limits  Outcome: Progressing  Goal: Hemodynamic stability and optimal renal function maintained  Outcome: Progressing  Goal: Glucose maintained within prescribed range  Outcome: Progressing     Problem: Hematologic - Adult  Goal: Maintains hematologic stability  Outcome: Progressing

## 2025-05-24 LAB
HIV-1 QNT LOG, IU/ML: 5.74 LOG CPY/ML
HIV-1 QNT, IU/ML: ABNORMAL CPY/ML
INTERPRETATION: DETECTED

## 2025-05-25 LAB
BACTERIA BLD CULT ORG #2: NORMAL
BACTERIA BLD CULT: NORMAL

## 2025-05-27 LAB
HIV QUANT LOG: 5.91 LOG CPY/ML
HIV-1 RNA BY PCR, QN: ABNORMAL CPY/ML
HIV-1 RNA BY PCR, QN: DETECTED
SOURCE: ABNORMAL